# Patient Record
Sex: MALE | Race: BLACK OR AFRICAN AMERICAN | NOT HISPANIC OR LATINO | Employment: OTHER | URBAN - METROPOLITAN AREA
[De-identification: names, ages, dates, MRNs, and addresses within clinical notes are randomized per-mention and may not be internally consistent; named-entity substitution may affect disease eponyms.]

---

## 2019-04-26 ENCOUNTER — TELEPHONE (OUTPATIENT)
Dept: FAMILY MEDICINE | Facility: CLINIC | Age: 29
End: 2019-04-26

## 2019-04-26 NOTE — TELEPHONE ENCOUNTER
Got edmondold of his grandmother who is his caretaker.  She stated that he has a brain injury and is unable to speak.  She also stated that he does have a primary clinic he will be following up with for his discharge.    Alma Ledezma, ALFREDO

## 2019-04-26 NOTE — TELEPHONE ENCOUNTER
Called patient left a message to call the clinic regarding being discharge from the hospital. Wanted to check on them on how their transitioning home is  and if they had any questions on the medications given at discharge. Will try again later.       Liseth Durbin MA

## 2021-06-16 PROBLEM — J96.00 ACUTE RESPIRATORY FAILURE (H): Status: ACTIVE | Noted: 2019-04-22

## 2021-06-16 PROBLEM — J96.00 ACUTE RESPIRATORY FAILURE, UNSP W HYPOXIA OR HYPERCAPNIA (H): Status: ACTIVE | Noted: 2019-04-22

## 2022-03-29 ENCOUNTER — APPOINTMENT (OUTPATIENT)
Dept: RADIOLOGY | Facility: HOSPITAL | Age: 32
DRG: 177 | End: 2022-03-29
Attending: EMERGENCY MEDICINE
Payer: COMMERCIAL

## 2022-03-29 ENCOUNTER — HOSPITAL ENCOUNTER (INPATIENT)
Facility: HOSPITAL | Age: 32
LOS: 3 days | Discharge: HOME | DRG: 177 | End: 2022-04-01
Attending: EMERGENCY MEDICINE | Admitting: INTERNAL MEDICINE
Payer: COMMERCIAL

## 2022-03-29 ENCOUNTER — APPOINTMENT (OUTPATIENT)
Dept: CT IMAGING | Facility: HOSPITAL | Age: 32
DRG: 177 | End: 2022-03-29
Attending: EMERGENCY MEDICINE
Payer: COMMERCIAL

## 2022-03-29 DIAGNOSIS — J18.9 PNEUMONIA DUE TO INFECTIOUS ORGANISM, UNSPECIFIED LATERALITY, UNSPECIFIED PART OF LUNG: Primary | ICD-10-CM

## 2022-03-29 DIAGNOSIS — I47.10 PAROXYSMAL SUPRAVENTRICULAR TACHYCARDIA (H): ICD-10-CM

## 2022-03-29 DIAGNOSIS — R11.10 VOMITING, INTRACTABILITY OF VOMITING NOT SPECIFIED, PRESENCE OF NAUSEA NOT SPECIFIED, UNSPECIFIED VOMITING TYPE: ICD-10-CM

## 2022-03-29 DIAGNOSIS — J69.0 ASPIRATION PNEUMONIA, UNSPECIFIED ASPIRATION PNEUMONIA TYPE, UNSPECIFIED LATERALITY, UNSPECIFIED PART OF LUNG (H): ICD-10-CM

## 2022-03-29 LAB
ALBUMIN SERPL-MCNC: 3.5 G/DL (ref 3.5–5)
ALP SERPL-CCNC: 66 U/L (ref 45–120)
ALT SERPL W P-5'-P-CCNC: 15 U/L (ref 0–45)
ANION GAP SERPL CALCULATED.3IONS-SCNC: 17 MMOL/L (ref 5–18)
AST SERPL W P-5'-P-CCNC: 11 U/L (ref 0–40)
ATRIAL RATE - MUSE: 126 BPM
ATRIAL RATE - MUSE: 227 BPM
BASOPHILS # BLD AUTO: 0 10E3/UL (ref 0–0.2)
BASOPHILS NFR BLD AUTO: 0 %
BILIRUB SERPL-MCNC: 0.8 MG/DL (ref 0–1)
BUN SERPL-MCNC: 14 MG/DL (ref 8–22)
CALCIUM SERPL-MCNC: 10 MG/DL (ref 8.5–10.5)
CHLORIDE BLD-SCNC: 97 MMOL/L (ref 98–107)
CO2 SERPL-SCNC: 22 MMOL/L (ref 22–31)
CREAT SERPL-MCNC: 0.83 MG/DL (ref 0.7–1.3)
DIASTOLIC BLOOD PRESSURE - MUSE: NORMAL MMHG
DIASTOLIC BLOOD PRESSURE - MUSE: NORMAL MMHG
EOSINOPHIL # BLD AUTO: 0 10E3/UL (ref 0–0.7)
EOSINOPHIL NFR BLD AUTO: 0 %
ERYTHROCYTE [DISTWIDTH] IN BLOOD BY AUTOMATED COUNT: 13.9 % (ref 10–15)
FLUAV RNA SPEC QL NAA+PROBE: NEGATIVE
FLUBV RNA RESP QL NAA+PROBE: NEGATIVE
GFR SERPL CREATININE-BSD FRML MDRD: >90 ML/MIN/1.73M2
GLUCOSE BLD-MCNC: 178 MG/DL (ref 70–125)
HCT VFR BLD AUTO: 39.5 % (ref 40–53)
HGB BLD-MCNC: 12.7 G/DL (ref 13.3–17.7)
IMM GRANULOCYTES # BLD: 0 10E3/UL
IMM GRANULOCYTES NFR BLD: 0 %
INR PPP: 1.39 (ref 0.85–1.15)
INTERPRETATION ECG - MUSE: NORMAL
INTERPRETATION ECG - MUSE: NORMAL
LACTATE SERPL-SCNC: 1.4 MMOL/L (ref 0.7–2)
LACTATE SERPL-SCNC: 4.8 MMOL/L (ref 0.7–2)
LIPASE SERPL-CCNC: 21 U/L (ref 0–52)
LYMPHOCYTES # BLD AUTO: 0.4 10E3/UL (ref 0.8–5.3)
LYMPHOCYTES NFR BLD AUTO: 7 %
MCH RBC QN AUTO: 30.5 PG (ref 26.5–33)
MCHC RBC AUTO-ENTMCNC: 32.2 G/DL (ref 31.5–36.5)
MCV RBC AUTO: 95 FL (ref 78–100)
MONOCYTES # BLD AUTO: 0.2 10E3/UL (ref 0–1.3)
MONOCYTES NFR BLD AUTO: 3 %
NEUTROPHILS # BLD AUTO: 5.3 10E3/UL (ref 1.6–8.3)
NEUTROPHILS NFR BLD AUTO: 90 %
NRBC # BLD AUTO: 0 10E3/UL
NRBC BLD AUTO-RTO: 0 /100
P AXIS - MUSE: 63 DEGREES
P AXIS - MUSE: NORMAL DEGREES
PLATELET # BLD AUTO: 179 10E3/UL (ref 150–450)
POTASSIUM BLD-SCNC: 4.6 MMOL/L (ref 3.5–5)
PR INTERVAL - MUSE: 124 MS
PR INTERVAL - MUSE: NORMAL MS
PROT SERPL-MCNC: 8.9 G/DL (ref 6–8)
QRS DURATION - MUSE: 68 MS
QRS DURATION - MUSE: 78 MS
QT - MUSE: 200 MS
QT - MUSE: 314 MS
QTC - MUSE: 384 MS
QTC - MUSE: 454 MS
R AXIS - MUSE: 78 DEGREES
R AXIS - MUSE: 98 DEGREES
RBC # BLD AUTO: 4.17 10E6/UL (ref 4.4–5.9)
SARS-COV-2 RNA RESP QL NAA+PROBE: NEGATIVE
SODIUM SERPL-SCNC: 136 MMOL/L (ref 136–145)
SYSTOLIC BLOOD PRESSURE - MUSE: NORMAL MMHG
SYSTOLIC BLOOD PRESSURE - MUSE: NORMAL MMHG
T AXIS - MUSE: 208 DEGREES
T AXIS - MUSE: 69 DEGREES
VENTRICULAR RATE- MUSE: 126 BPM
VENTRICULAR RATE- MUSE: 222 BPM
WBC # BLD AUTO: 6 10E3/UL (ref 4–11)

## 2022-03-29 PROCEDURE — 250N000011 HC RX IP 250 OP 636: Performed by: INTERNAL MEDICINE

## 2022-03-29 PROCEDURE — 74177 CT ABD & PELVIS W/CONTRAST: CPT

## 2022-03-29 PROCEDURE — 96365 THER/PROPH/DIAG IV INF INIT: CPT

## 2022-03-29 PROCEDURE — 83605 ASSAY OF LACTIC ACID: CPT | Performed by: INTERNAL MEDICINE

## 2022-03-29 PROCEDURE — 258N000003 HC RX IP 258 OP 636: Performed by: EMERGENCY MEDICINE

## 2022-03-29 PROCEDURE — 96361 HYDRATE IV INFUSION ADD-ON: CPT

## 2022-03-29 PROCEDURE — 80053 COMPREHEN METABOLIC PANEL: CPT | Performed by: EMERGENCY MEDICINE

## 2022-03-29 PROCEDURE — 36415 COLL VENOUS BLD VENIPUNCTURE: CPT | Performed by: EMERGENCY MEDICINE

## 2022-03-29 PROCEDURE — 93005 ELECTROCARDIOGRAM TRACING: CPT | Performed by: EMERGENCY MEDICINE

## 2022-03-29 PROCEDURE — 85610 PROTHROMBIN TIME: CPT | Performed by: EMERGENCY MEDICINE

## 2022-03-29 PROCEDURE — 96375 TX/PRO/DX INJ NEW DRUG ADDON: CPT

## 2022-03-29 PROCEDURE — 250N000011 HC RX IP 250 OP 636: Performed by: EMERGENCY MEDICINE

## 2022-03-29 PROCEDURE — 96366 THER/PROPH/DIAG IV INF ADDON: CPT

## 2022-03-29 PROCEDURE — 999N000127 HC STATISTIC PERIPHERAL IV START W US GUIDANCE

## 2022-03-29 PROCEDURE — 83690 ASSAY OF LIPASE: CPT | Performed by: EMERGENCY MEDICINE

## 2022-03-29 PROCEDURE — 85025 COMPLETE CBC W/AUTO DIFF WBC: CPT | Performed by: EMERGENCY MEDICINE

## 2022-03-29 PROCEDURE — 87040 BLOOD CULTURE FOR BACTERIA: CPT | Performed by: EMERGENCY MEDICINE

## 2022-03-29 PROCEDURE — 83605 ASSAY OF LACTIC ACID: CPT | Performed by: EMERGENCY MEDICINE

## 2022-03-29 PROCEDURE — 87636 SARSCOV2 & INF A&B AMP PRB: CPT | Performed by: EMERGENCY MEDICINE

## 2022-03-29 PROCEDURE — 99285 EMERGENCY DEPT VISIT HI MDM: CPT | Mod: 25

## 2022-03-29 PROCEDURE — C9803 HOPD COVID-19 SPEC COLLECT: HCPCS

## 2022-03-29 PROCEDURE — 36415 COLL VENOUS BLD VENIPUNCTURE: CPT | Performed by: INTERNAL MEDICINE

## 2022-03-29 PROCEDURE — 258N000003 HC RX IP 258 OP 636: Performed by: INTERNAL MEDICINE

## 2022-03-29 PROCEDURE — 71045 X-RAY EXAM CHEST 1 VIEW: CPT

## 2022-03-29 PROCEDURE — 120N000001 HC R&B MED SURG/OB

## 2022-03-29 PROCEDURE — 99223 1ST HOSP IP/OBS HIGH 75: CPT | Performed by: INTERNAL MEDICINE

## 2022-03-29 RX ORDER — PIPERACILLIN SODIUM, TAZOBACTAM SODIUM 3; .375 G/15ML; G/15ML
3.38 INJECTION, POWDER, LYOPHILIZED, FOR SOLUTION INTRAVENOUS ONCE
Status: COMPLETED | OUTPATIENT
Start: 2022-03-29 | End: 2022-03-29

## 2022-03-29 RX ORDER — MORPHINE SULFATE 2 MG/ML
2 INJECTION, SOLUTION INTRAMUSCULAR; INTRAVENOUS
Status: DISCONTINUED | OUTPATIENT
Start: 2022-03-29 | End: 2022-04-01 | Stop reason: HOSPADM

## 2022-03-29 RX ORDER — ONDANSETRON 2 MG/ML
4 INJECTION INTRAMUSCULAR; INTRAVENOUS EVERY 6 HOURS PRN
Status: DISCONTINUED | OUTPATIENT
Start: 2022-03-29 | End: 2022-04-01 | Stop reason: HOSPADM

## 2022-03-29 RX ORDER — ONDANSETRON 2 MG/ML
4 INJECTION INTRAMUSCULAR; INTRAVENOUS EVERY 30 MIN PRN
Status: DISCONTINUED | OUTPATIENT
Start: 2022-03-29 | End: 2022-03-29

## 2022-03-29 RX ORDER — LIDOCAINE 40 MG/G
CREAM TOPICAL
Status: DISCONTINUED | OUTPATIENT
Start: 2022-03-29 | End: 2022-04-01 | Stop reason: HOSPADM

## 2022-03-29 RX ORDER — ADENOSINE 3 MG/ML
6 INJECTION, SOLUTION INTRAVENOUS ONCE
Status: DISCONTINUED | OUTPATIENT
Start: 2022-03-29 | End: 2022-03-29

## 2022-03-29 RX ORDER — ACETAMINOPHEN 325 MG/1
650 TABLET ORAL EVERY 6 HOURS PRN
Status: DISCONTINUED | OUTPATIENT
Start: 2022-03-29 | End: 2022-03-30

## 2022-03-29 RX ORDER — ACETAMINOPHEN 650 MG/1
650 SUPPOSITORY RECTAL EVERY 6 HOURS PRN
Status: DISCONTINUED | OUTPATIENT
Start: 2022-03-29 | End: 2022-03-30

## 2022-03-29 RX ORDER — ONDANSETRON 4 MG/1
4 TABLET, ORALLY DISINTEGRATING ORAL EVERY 6 HOURS PRN
Status: DISCONTINUED | OUTPATIENT
Start: 2022-03-29 | End: 2022-04-01 | Stop reason: HOSPADM

## 2022-03-29 RX ORDER — PIPERACILLIN SODIUM, TAZOBACTAM SODIUM 3; .375 G/15ML; G/15ML
3.38 INJECTION, POWDER, LYOPHILIZED, FOR SOLUTION INTRAVENOUS EVERY 8 HOURS
Status: DISCONTINUED | OUTPATIENT
Start: 2022-03-29 | End: 2022-03-31

## 2022-03-29 RX ORDER — IOPAMIDOL 755 MG/ML
100 INJECTION, SOLUTION INTRAVASCULAR ONCE
Status: COMPLETED | OUTPATIENT
Start: 2022-03-29 | End: 2022-03-29

## 2022-03-29 RX ORDER — SODIUM CHLORIDE 9 MG/ML
INJECTION, SOLUTION INTRAVENOUS CONTINUOUS
Status: DISCONTINUED | OUTPATIENT
Start: 2022-03-29 | End: 2022-03-30

## 2022-03-29 RX ORDER — SODIUM CHLORIDE 9 MG/ML
INJECTION, SOLUTION INTRAVENOUS CONTINUOUS
Status: DISCONTINUED | OUTPATIENT
Start: 2022-03-29 | End: 2022-04-01 | Stop reason: HOSPADM

## 2022-03-29 RX ORDER — ADENOSINE 3 MG/ML
12 INJECTION, SOLUTION INTRAVENOUS
Status: DISCONTINUED | OUTPATIENT
Start: 2022-03-29 | End: 2022-03-29

## 2022-03-29 RX ADMIN — SODIUM CHLORIDE 500 ML: 9 INJECTION, SOLUTION INTRAVENOUS at 13:01

## 2022-03-29 RX ADMIN — SODIUM CHLORIDE: 9 INJECTION, SOLUTION INTRAVENOUS at 20:34

## 2022-03-29 RX ADMIN — SODIUM CHLORIDE: 9 INJECTION, SOLUTION INTRAVENOUS at 19:00

## 2022-03-29 RX ADMIN — ENOXAPARIN SODIUM 30 MG: 30 INJECTION SUBCUTANEOUS at 20:34

## 2022-03-29 RX ADMIN — SODIUM CHLORIDE: 9 INJECTION, SOLUTION INTRAVENOUS at 14:47

## 2022-03-29 RX ADMIN — MORPHINE SULFATE 2 MG: 2 INJECTION, SOLUTION INTRAMUSCULAR; INTRAVENOUS at 13:02

## 2022-03-29 RX ADMIN — PIPERACILLIN AND TAZOBACTAM 3.38 G: 3; .375 INJECTION, POWDER, LYOPHILIZED, FOR SOLUTION INTRAVENOUS at 14:55

## 2022-03-29 RX ADMIN — IOPAMIDOL 100 ML: 755 INJECTION, SOLUTION INTRAVENOUS at 16:15

## 2022-03-29 RX ADMIN — SODIUM CHLORIDE 1000 ML: 9 INJECTION, SOLUTION INTRAVENOUS at 13:34

## 2022-03-29 RX ADMIN — ONDANSETRON 4 MG: 2 INJECTION INTRAMUSCULAR; INTRAVENOUS at 12:51

## 2022-03-29 RX ADMIN — PIPERACILLIN AND TAZOBACTAM 3.38 G: 3; .375 INJECTION, POWDER, LYOPHILIZED, FOR SOLUTION INTRAVENOUS at 20:34

## 2022-03-29 ASSESSMENT — ACTIVITIES OF DAILY LIVING (ADL)
ADLS_ACUITY_SCORE: 18
DOING_ERRANDS_INDEPENDENTLY_DIFFICULTY: NO
CONCENTRATING,_REMEMBERING_OR_MAKING_DECISIONS_DIFFICULTY: YES
DIFFICULTY_COMMUNICATING: YES
ADLS_ACUITY_SCORE: 18
SWALLOWING: 2-->DIFFICULTY SWALLOWING LIQUIDS/FOODS
ADLS_ACUITY_SCORE: 8
SWALLOWING: 2-->DIFFICULTY SWALLOWING LIQUIDS/FOODS
EATING/SWALLOWING: OTHER (SEE COMMENTS)
ADLS_ACUITY_SCORE: 12
ADLS_ACUITY_SCORE: 24
ADLS_ACUITY_SCORE: 18
DIFFICULTY_EATING/SWALLOWING: YES
EATING: 2-->COMPLETELY DEPENDENT (NOT DEVELOPMENTALLY APPROPRIATE)
WEAR_GLASSES_OR_BLIND: NO
WALKING_OR_CLIMBING_STAIRS_DIFFICULTY: NO
FALL_HISTORY_WITHIN_LAST_SIX_MONTHS: NO
HEARING_DIFFICULTY_OR_DEAF: NO
DRESSING/BATHING_DIFFICULTY: NO
EATING: 2-->COMPLETELY DEPENDENT
CHANGE_IN_FUNCTIONAL_STATUS_SINCE_ONSET_OF_CURRENT_ILLNESS/INJURY: NO
TOILETING_ISSUES: NO

## 2022-03-29 ASSESSMENT — ENCOUNTER SYMPTOMS: VOMITING: 1

## 2022-03-29 NOTE — PHARMACY-ADMISSION MEDICATION HISTORY
Pharmacy Note - Admission Medication History    Pertinent Provider Information: Patient is not on any medications PTA      ______________________________________________________________________    Prior To Admission (PTA) med list completed and updated in EMR.       No outpatient medications have been marked as taking for the 3/29/22 encounter (Hospital Encounter).       Information [facility](s[facility]  Method of interview communication: in-person    Summary of Changes to PTA Med List  New: -  Discontinued: diltiazem, tylenol, dayquil, nyquil   Changed: -    Patient was asked about OTC/herbal products specifically.  PTA med list reflects this.    Allergies were reviewed, assessed, and updated with the patient.      Patient does not use any multi-dose medications prior to admission.    The information provided in this note is only as accurate as the sources available at the time of the update(s).    Thank you for the opportunity to participate in the care of this patient.    ZITA SARMIENTO RPH  3/29/2022 5:09 PM

## 2022-03-29 NOTE — ED PROVIDER NOTES
EMERGENCY DEPARTMENT SIGN OUT NOTE        ED COURSE AND MEDICAL DECISION MAKING  Patient was signed out to me by Dr Debi Tinsley at 2:00 PM.  5:37 PM.  CT imaging without evidence of any acute intra-abdominal pathology. Redemonstration of prior noted aspiration pneumonia.  We will proceed with admission.  5:54 PM Discussed with Dr. Perera, who accepts patient for admission.  He was also cautioned about the patient having episodes of SVT when physically or emotionally stressed.    In brief, Marquise LESLEE Brody is a 31 year old male who initially presented for evaluation of vomiting.  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 Today (3/29), the patient has been experiencing constant vomiting up food. A couple weeks ago, his 20 size was placed in his GJ tube, and he began to loose weight. Afterward, he saw a provider, and it was replaced by a 22 size. The patient's family is not sure if the vomiting is due to the wrong sized being placed, or build up of food. His grandmother states that his stomach has been swelling above his GJ tube more than normal, where usually it swells more around his GJ tube. In addition, his grandmother states that the patient becomes excited with stimulation. She reports that usually he is smiling and laughing, but today he presents much more serious than normal, with his arms more stiff.    At time of sign out, disposition was pending CT abdomen pelvis.    FINAL IMPRESSION    1. Aspiration pneumonia, unspecified aspiration pneumonia type, unspecified laterality, unspecified part of lung (H)    2. Vomiting, intractability of vomiting not specified, presence of nausea not specified, unspecified vomiting type    3. Paroxysmal supraventricular tachycardia (H)        ED MEDS  Medications   ondansetron (ZOFRAN) injection 4 mg (4 mg Intravenous Not Given 3/29/22 1413)   0.9% sodium chloride BOLUS (0 mLs Intravenous Stopped 3/29/22 1435)     Followed by    sodium chloride 0.9% infusion ( Intravenous Rate/Dose Verify 3/29/22 3774)   morphine (PF) injection 2 mg (2 mg Intravenous Given 3/29/22 1302)   0.9% sodium chloride BOLUS (0 mLs Intravenous Stopped 3/29/22 1435)   piperacillin-tazobactam (ZOSYN) 3.375 g vial to attach to  mL bag (0 g Intravenous Stopped 3/29/22 1743)   iopamidol (ISOVUE-370) solution 100 mL (100 mLs Intravenous Given 3/29/22 1615)       LAB  Labs Ordered and Resulted from Time of ED Arrival to Time of ED Departure   COMPREHENSIVE METABOLIC PANEL - Abnormal       Result Value    Sodium 136      Potassium 4.6      Chloride 97 (*)     Carbon Dioxide (CO2) 22      Anion Gap 17      Urea Nitrogen 14      Creatinine 0.83      Calcium 10.0      Glucose 178 (*)     Alkaline Phosphatase 66      AST 11      ALT 15      Protein Total 8.9 (*)     Albumin 3.5      Bilirubin Total 0.8      GFR Estimate >90     CBC WITH PLATELETS AND DIFFERENTIAL - Abnormal    WBC Count 6.0      RBC Count 4.17 (*)     Hemoglobin 12.7 (*)     Hematocrit 39.5 (*)     MCV 95      MCH 30.5      MCHC 32.2      RDW 13.9      Platelet Count 179      % Neutrophils 90      % Lymphocytes 7      % Monocytes 3      % Eosinophils 0      % Basophils 0      % Immature Granulocytes 0      NRBCs per 100 WBC 0      Absolute Neutrophils 5.3      Absolute Lymphocytes 0.4 (*)     Absolute Monocytes 0.2      Absolute Eosinophils 0.0      Absolute Basophils 0.0      Absolute Immature Granulocytes 0.0      Absolute NRBCs 0.0     INR - Abnormal    INR 1.39 (*)    LACTIC ACID WHOLE BLOOD - Abnormal    Lactic Acid 4.8 (*)    LIPASE - Normal    Lipase 21     INFLUENZA A/B & SARS-COV2 PCR MULTIPLEX - Normal    Influenza A PCR Negative      Influenza B PCR Negative      SARS CoV2 PCR Negative     BLOOD CULTURE   BLOOD CULTURE       RADIOLOGY    CT Abdomen Pelvis w Contrast   Final Result   IMPRESSION:    1.  Severe right lower lobe and mild to moderate right middle and left lower lobar aspiration  pneumonia. No pleural effusion.   2.  No acute findings in the abdomen or pelvis. No evidence of a bowel obstruction or inflammatory process.   3.  Satisfactory PEG tube position. No adjacent fluid collection or extraluminal air.   4.  Stable large bladder stones.      XR Chest Port 1 View   Final Result   IMPRESSION: There some mild diffuse infiltrates throughout both lungs greater on the right side. Patient had some similar infiltrates back on 04/23/2019. Uncertain if these are all chronic findings or recurrent infiltrates. Concerning for an acute    pneumonia given the appearance.          DISCHARGE MEDS  New Prescriptions    No medications on file       Heladio Ch MD  Emergency Medicine  [facility] EMERGENCY DEPARTMENT  [facility] [facility] [address]  [phone]     Heladio Ch MD  03/29/22 7877

## 2022-03-29 NOTE — ED NOTES
Assumed care, received report from Devika. Family members at bedside with pt. Pt tolerated the Covid swab. No respiratory distress.

## 2022-03-29 NOTE — ED NOTES
Patient O2 reading 88-90% on room air with good wave form. Patient coughing, appearing to have difficulty clearing secretions completely. Continuing to use younker at bedside. Placed on 2L O2 per nasal canula.

## 2022-03-29 NOTE — ED TRIAGE NOTES
Pt, non  Verbal, with family caregivers reporting, vomiting x 4 in past 24 hours,n ormal stools. No daily meds, pt does get tube feedings , wheelchair bound.

## 2022-03-29 NOTE — ED PROVIDER NOTES
Emergency Department Encounter     Evaluation Date & Time:   3/29/2022 12:02 PM    CHIEF COMPLAINT:  Vomiting      Triage Note:Pt, non  Verbal, with family caregivers reporting, vomiting x 4 in past 24 hours,n ormal stools. No daily meds, pt does get tube feedings , wheelchair bound.      FINAL IMPRESSION:    ICD-10-CM    1. Aspiration pneumonia, unspecified aspiration pneumonia type, unspecified laterality, unspecified part of lung (H)  J69.0    2. Vomiting, intractability of vomiting not specified, presence of nausea not specified, unspecified vomiting type  R11.10    3. Paroxysmal supraventricular tachycardia (H)  I47.1        Impression and Plan     ED COURSE & MEDICAL DECISION MAKING:        ED Course as of 03/29/22 1428   Tue Mar 29, 2022   1222 Initial encounter and examination of the patient.   1249 Patient having vomiting.  May be malfunctioning gj tube, sbo, or other gi cause of vomiting.  Patient cannot participate in giving history or every really localizing where he hurts and he will grimace and moan when unknown persons press on his body.  So, will do ct abd.  Rn tried to pull back fluid in case he had a lot of fluid in abd we could remove to avoid further emesis, but they were unable to pull back anything.  He has crackles and cough when listen to lungs, so ordered cxr as he is high risk for aspiration as well.  He looks dry as a cause of hypotension with the recurrent emesis so giving boluses.   1310 Patient's rhythm changed, will order repeat EKG.   1329 Rhythm change is to svt.  Patient remains awake alert and lab is trying to draw labs.  His family are both at the bedside and they note this is normal for him to spike his hr up when nursing staff is working on him.  We will see if this goes down once he can rest between interventions, and now that got zofran/morphine and fluids are going in.  His family agree that is the best approach.   1334 Looking back in his chart I do see that in April 2019  he did have SVT as well near 200s.  He is not cyanotic   1340 They are calling picc rn to get the blood cultures.  Rn will give pt a break now to relax, let meds set in, and just get portable cxr or now.   1359 Patient's cxr does appear to have an infiltrate.  Will need blood cultures, and then will do ct of the chest when he goes over for his abd.  He continues to tolerate the svt well - very alert.  Picc rn drawing blood.   1428 Prior to receiving any rate control med for it, once he was no longer being poked for labs/iv, his svt spontaneously resolved.  Bp I,proving with fluids.       At the conclusion of the encounter I discussed the results of all the tests and the disposition. The questions were answered. The patient or family acknowledged understanding and was agreeable with the care plan.          0 minutes of critical care time    PPE: head covering and face mask  MEDICATIONS GIVEN IN THE EMERGENCY DEPARTMENT:  Medications   ondansetron (ZOFRAN) injection 4 mg (4 mg Intravenous Not Given 3/29/22 1413)   0.9% sodium chloride BOLUS (1,000 mLs Intravenous New Bag 3/29/22 1334)     Followed by   sodium chloride 0.9% infusion (has no administration in time range)   morphine (PF) injection 2 mg (2 mg Intravenous Given 3/29/22 1302)   piperacillin-tazobactam (ZOSYN) 3.375 g vial to attach to  mL bag (has no administration in time range)   0.9% sodium chloride BOLUS (500 mLs Intravenous New Bag 3/29/22 1301)       NEW PRESCRIPTIONS STARTED AT TODAY'S ED VISIT:  New Prescriptions    No medications on file       HPI     HPI     Marquise LESLEE Brody is a 31 year old male with a pertinent history of TBI, GJ tube in place, MRSA, spastic tetraplegia, spasticity, anoxic brain injury, acute respiratory failure, acute encephalopathy, aspiration pneumonia of right lower lobe due to vomit, and dysphagia who presents to this ED via private car for evaluation of vomiting.    The patient presents with grandmother and uncle, who  he lives with.  HPI and ROS limited due to patient being nonverbal.    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 Today (3/29), the patient has been experiencing constant vomiting up food. A couple weeks ago, his 20 size was placed in his GJ tube, and he began to loose weight. Afterward, he saw a provider, and it was replaced by a 22 size. The patient's family is not sure if the vomiting is due to the wrong sized being placed, or build up of food. His grandmother states that his stomach has been swelling above his GJ tube more than normal, where usually it swells more around his GJ tube. In addition, his grandmother states that the patient becomes excited with stimulation. She reports that usually he is smiling and laughing, but today he presents much more serious than normal, with his arms more stiff.    REVIEW OF SYSTEMS:  Review of Systems   Gastrointestinal: Positive for vomiting.     remainder of systems are all otherwise negative.        Medical History     History reviewed. No pertinent past medical history.    Past Surgical History:   Procedure Laterality Date     GASTROSTOMY, INSERT TUBE, COMBINED       PICC  4/22/2019            No family history on file.    Social History     Tobacco Use     Smoking status: Never Smoker     Smokeless tobacco: None   Substance Use Topics     Alcohol use: No     Drug use: None     Comment: Drug use: JUNIOR       acetaminophen (TYLENOL) 650 mg/20.3 mL Soln  diltiazem (CARDIZEM CD) 180 MG 24 hr capsule  DM/acetaminophen/doxylamine (VICKS NYQUIL NIGHTTIME RELIEF ORAL)  DM/PE/acetaminophen/doxylamine (VICKS DAYQUIL-NYQUIL ORAL)  guaiFENesin 200 mg tablet        Physical Exam     First Vitals:  Patient Vitals for the past 24 hrs:   BP Temp Temp src Pulse Resp SpO2 Height Weight   03/29/22 1400 107/54 -- -- (!) 188 24 96 % -- --   03/29/22 1345 96/53 -- -- (!) 207 (!) 40 93 % -- --   03/29/22 1339 -- -- -- (!) 198 (!) 37 95 % -- --  "  03/29/22 1334 -- -- -- (!) 211 (!) 32 92 % -- --   03/29/22 1330 (!) 88/52 -- -- (!) 205 21 -- -- --   03/29/22 1300 94/68 -- -- (!) 138 -- 92 % -- --   03/29/22 1230 104/84 -- -- (!) 138 -- 96 % -- --   03/29/22 1229 -- -- -- (!) 133 -- 99 % -- --   03/29/22 1227 108/80 -- -- -- -- -- -- --   03/29/22 1122 -- -- -- -- -- 94 % -- --   03/29/22 1119 (!) 81/51 99.4  F (37.4  C) Temporal -- 20 96 % 1.905 m (6' 3\") 52.2 kg (115 lb)       PHYSICAL EXAM:   Constitutional:  Laying semi-reclined in bed.   HENT:  Normocephalic, posterior pharynx wnl, mucous membranes tacky and dark pink   Eyes:  PERRL, EOMI, Conjunctiva normal, No discharge, no scleral icterus.  Respiratory: slight cough, coarse breath sounds.  Cardiovascular:  Slightly tachycardic, nl s1s2 0 murmurs, rubs, or gallops.  Peripheral pulses dp, pt, and radial are wnl.  no peripheral edema   GI:  Bowel sounds normal, Soft, No tenderness, No flank tenderness, nondistended. GJ tube located in mid epigastric area.surrounding skin c/d/i without rash.  :No CVA tenderness.   Musculoskeletal:  Moves all extremities.  No erythematous or swollen major joints,   Integument:  Cool extremities.   Lymphatic:  No cervical lymphadenopathy  Neurologic:  Quadriplegic, some contractures. Opens eyes to stimulation, moans with stimulation.  Psychiatric:  Affect normal, Judgment normal, Mood normal.     Results     LAB AND RADIOLOGY:  All pertinent labs reviewed and interpreted  Results for orders placed or performed during the hospital encounter of 03/29/22   XR Chest Port 1 View     Status: None    Narrative    EXAM: XR CHEST PORT 1 VIEW  LOCATION: [facility]  DATE/TIME: 3/29/2022 12:54 PM    INDICATION: cough.  recent recurrent emesis in quadriplegic with gj tube.  COMPARISON: 04/23/2019      Impression    IMPRESSION: There some mild diffuse infiltrates throughout both lungs greater on the right side. Patient had some similar infiltrates back on " 04/23/2019. Uncertain if these are all chronic findings or recurrent infiltrates. Concerning for an acute   pneumonia given the appearance.   Comprehensive metabolic panel     Status: Abnormal   Result Value Ref Range    Sodium 136 136 - 145 mmol/L    Potassium 4.6 3.5 - 5.0 mmol/L    Chloride 97 (L) 98 - 107 mmol/L    Carbon Dioxide (CO2) 22 22 - 31 mmol/L    Anion Gap 17 5 - 18 mmol/L    Urea Nitrogen 14 8 - 22 mg/dL    Creatinine 0.83 0.70 - 1.30 mg/dL    Calcium 10.0 8.5 - 10.5 mg/dL    Glucose 178 (H) 70 - 125 mg/dL    Alkaline Phosphatase 66 45 - 120 U/L    AST 11 0 - 40 U/L    ALT 15 0 - 45 U/L    Protein Total 8.9 (H) 6.0 - 8.0 g/dL    Albumin 3.5 3.5 - 5.0 g/dL    Bilirubin Total 0.8 0.0 - 1.0 mg/dL    GFR Estimate >90 >60 mL/min/1.73m2   Lipase     Status: Normal   Result Value Ref Range    Lipase 21 0 - 52 U/L   CBC with platelets differential     Status: None ()    Narrative    The following orders were created for panel order CBC with platelets differential.  Procedure                               Abnormality         Status                     ---------                               -----------         ------                     CBC with platelets and d...[[id number]]                                                   Please view results for these tests on the individual orders.         ECG:    Performed at: 1319    Impression: svt rate of 222,pr *, r axis, qrs 68ms, qtc 384ms, prt axes * 98 208.  Nonspecific st fidnings in inferior limb leads likely due to rate.  Compared to 4/23/19 patient is now in svt changed from nsr.    Performed at:  1423  Impression:  nst rate of 126, no acute stfindings.  Low voltage in I, avl.  Pr 124ms, qrs 78ms, qtc 454ms, prt axes 63 78 69.  Compared to earlier today svt has resolved and the nonspecific st findings resolved.    I have independently reviewed and interpreted the EKS(s) documented above    PROCEDURES:  Procedures:      [facility] Documentation       CMS  Diagnoses:most of patient's elevated hr and low bp appears to be due to dehydration and agitation.  Cannot confirm they are from infection at this time to diagnose sepsis as other causes are apparent.       The creation of this record is based on the scribe s observations of the work being performed by Niecy Harris and the provider s statements to them. This document has been checked and approved by MD Debi Montana MD  Emergency Medicine  [facility] EMERGENCY DEPARTMENT       Debi Tinsley MD  03/29/22 1342       Debi Tinsley MD  03/29/22 1402       Debi Tinsley MD  03/29/22 1426

## 2022-03-29 NOTE — ED NOTES
"[facility] ED Handoff Report    ED Chief Complaint: Vomiting and G-Tube complaints    ED Diagnosis:  (J69.0) Aspiration pneumonia, unspecified aspiration pneumonia type, unspecified laterality, unspecified part of lung (H)  Comment: na  Plan: na    (R11.10) Vomiting, intractability of vomiting not specified, presence of nausea not specified, unspecified vomiting type  Comment: na  Plan: na    (I47.1) Paroxysmal supraventricular tachycardia (H)  Comment: na  Plan: na       PMH:  History reviewed. No pertinent past medical history.     Code Status:  No Order     Falls Risk: Yes Band: Not applicable    Current Living Situation/Residence: live with family     Elimination Status: Continent: No     Activity Level: Total assist/lift    Patients Preferred Language:  English     Needed: No    Vital Signs:  BP 94/53   Pulse 102   Temp 99.4  F (37.4  C) (Temporal)   Resp 11   Ht 1.905 m (6' 3\")   Wt 52.2 kg (115 lb)   SpO2 100%   BMI 14.37 kg/m       Cardiac Rhythm: na    Pain Score: Patient is unable to quantify.    Is the Patient Confused:  No    Last Food or Drink: unknown    Focused Assessment:  Pt came to the ER because they did not think that his G-tube was working correctly. Pt is non-verbal, a quadriplegic and family is at bedside. He vomited and they believe he aspirated as his Xray showed aspiration pneumonia. Family is assisting pt with the yanker at bedside. Received antibiotics and morphine as family thought that he was in pain. CT of his abdomen showed the G-tube was in the right place.     Tests Performed: Done: Labs and Imaging    Treatments Provided:  jacinto    Family Dynamics/Concerns: No    Family Updated On Visitor Policy: Yes    Plan of Care Communicated to Family: Yes    Who Was Updated about Plan of Care: family at bedside    Belongings Checklist Done and Signed by Patient: No    Covid: asymptomatic , negative    Additional Information: jacinto    RN: Rita Mary RN 3/29/2022 6:11 " PM

## 2022-03-29 NOTE — ED NOTES
"ED Provider In Triage Note  [facility]  Encounter Date: Mar 29, 2022    Chief Complaint   Patient presents with     Vomiting   vomiting    Brief HPI:   Marquise LESLEE Brody is a 31 year old male presenting to the Emergency Department with a chief complaint of vomiting. Pt has a G tube that was recently replaced 1 wk ago. Now with vomiting. Normal stools. G tube working normally. Caregiver states he is moaning and she also thinks he is experiencing pain.    Brief Physical Exam:  BP (!) 81/51   Temp 99.4  F (37.4  C) (Temporal)   Resp 20   Ht 1.905 m (6' 3\")   Wt 52.2 kg (115 lb)   SpO2 96%   BMI 14.37 kg/m    General: Non-toxic appearing, chronically ill appearing with spastic quadraplegia in wheelchair. Nonverbal. Moaning.  HEENT: Atraumatic  Resp: No respiratory distress  Abdomen: Non-peritoneal  Neuro: Alert  Psych: Behavior appropriate      Plan Initiated in Triage:  Labs, CT due to vomiting and concern for pain in nonverbal patient      PIT Dispo:   Return to lobby while awaiting workup and ED bed availability    Gabby Hollis MD on 3/29/2022 at 11:14 AM    Patient was evaluated by the Physician in Triage due to a limitation of available rooms in the Emergency Department. A plan of care was discussed based on the information obtained on the initial evaluation and patient was consuled to return back to the Emergency Department lobby after this initial evalutaiton until results were obtained or a room became available in the Emergency Department. Patient was counseled not to leave prior to receiving the results of their workup.        Gabby Hollis MD  03/29/22 1120    "

## 2022-03-30 ENCOUNTER — APPOINTMENT (OUTPATIENT)
Dept: CARDIOLOGY | Facility: HOSPITAL | Age: 32
DRG: 177 | End: 2022-03-30
Attending: INTERNAL MEDICINE
Payer: COMMERCIAL

## 2022-03-30 LAB
ALBUMIN SERPL-MCNC: 2.6 G/DL (ref 3.5–5)
ALP SERPL-CCNC: 49 U/L (ref 45–120)
ALT SERPL W P-5'-P-CCNC: 11 U/L (ref 0–45)
ANION GAP SERPL CALCULATED.3IONS-SCNC: 5 MMOL/L (ref 5–18)
AST SERPL W P-5'-P-CCNC: 13 U/L (ref 0–40)
BILIRUB SERPL-MCNC: 0.5 MG/DL (ref 0–1)
BUN SERPL-MCNC: 12 MG/DL (ref 8–22)
CALCIUM SERPL-MCNC: 8.7 MG/DL (ref 8.5–10.5)
CHLORIDE BLD-SCNC: 108 MMOL/L (ref 98–107)
CO2 SERPL-SCNC: 25 MMOL/L (ref 22–31)
CREAT SERPL-MCNC: 0.63 MG/DL (ref 0.7–1.3)
ERYTHROCYTE [DISTWIDTH] IN BLOOD BY AUTOMATED COUNT: 14.4 % (ref 10–15)
GFR SERPL CREATININE-BSD FRML MDRD: >90 ML/MIN/1.73M2
GLUCOSE BLD-MCNC: 83 MG/DL (ref 70–125)
HCT VFR BLD AUTO: 34.5 % (ref 40–53)
HGB BLD-MCNC: 10.9 G/DL (ref 13.3–17.7)
LVEF ECHO: NORMAL
MCH RBC QN AUTO: 30.4 PG (ref 26.5–33)
MCHC RBC AUTO-ENTMCNC: 31.6 G/DL (ref 31.5–36.5)
MCV RBC AUTO: 96 FL (ref 78–100)
PLATELET # BLD AUTO: 155 10E3/UL (ref 150–450)
POTASSIUM BLD-SCNC: 3.8 MMOL/L (ref 3.5–5)
PROCALCITONIN SERPL-MCNC: 8.94 NG/ML (ref 0–0.49)
PROT SERPL-MCNC: 6.3 G/DL (ref 6–8)
RBC # BLD AUTO: 3.58 10E6/UL (ref 4.4–5.9)
SODIUM SERPL-SCNC: 138 MMOL/L (ref 136–145)
WBC # BLD AUTO: 7.6 10E3/UL (ref 4–11)

## 2022-03-30 PROCEDURE — 250N000011 HC RX IP 250 OP 636: Performed by: INTERNAL MEDICINE

## 2022-03-30 PROCEDURE — C9113 INJ PANTOPRAZOLE SODIUM, VIA: HCPCS | Performed by: INTERNAL MEDICINE

## 2022-03-30 PROCEDURE — 93306 TTE W/DOPPLER COMPLETE: CPT

## 2022-03-30 PROCEDURE — 99233 SBSQ HOSP IP/OBS HIGH 50: CPT | Performed by: EMERGENCY MEDICINE

## 2022-03-30 PROCEDURE — 80053 COMPREHEN METABOLIC PANEL: CPT | Performed by: INTERNAL MEDICINE

## 2022-03-30 PROCEDURE — 93306 TTE W/DOPPLER COMPLETE: CPT | Mod: 26 | Performed by: INTERNAL MEDICINE

## 2022-03-30 PROCEDURE — 84145 PROCALCITONIN (PCT): CPT | Performed by: INTERNAL MEDICINE

## 2022-03-30 PROCEDURE — 258N000003 HC RX IP 258 OP 636: Performed by: INTERNAL MEDICINE

## 2022-03-30 PROCEDURE — 36415 COLL VENOUS BLD VENIPUNCTURE: CPT | Performed by: INTERNAL MEDICINE

## 2022-03-30 PROCEDURE — 250N000013 HC RX MED GY IP 250 OP 250 PS 637: Performed by: INTERNAL MEDICINE

## 2022-03-30 PROCEDURE — 120N000001 HC R&B MED SURG/OB

## 2022-03-30 PROCEDURE — 85027 COMPLETE CBC AUTOMATED: CPT | Performed by: INTERNAL MEDICINE

## 2022-03-30 RX ORDER — ACETAMINOPHEN 650 MG/1
650 SUPPOSITORY RECTAL EVERY 6 HOURS PRN
Status: DISCONTINUED | OUTPATIENT
Start: 2022-03-30 | End: 2022-04-01 | Stop reason: HOSPADM

## 2022-03-30 RX ADMIN — PIPERACILLIN AND TAZOBACTAM 3.38 G: 3; .375 INJECTION, POWDER, LYOPHILIZED, FOR SOLUTION INTRAVENOUS at 12:24

## 2022-03-30 RX ADMIN — PIPERACILLIN AND TAZOBACTAM 3.38 G: 3; .375 INJECTION, POWDER, LYOPHILIZED, FOR SOLUTION INTRAVENOUS at 20:03

## 2022-03-30 RX ADMIN — ACETAMINOPHEN 650 MG: 325 TABLET ORAL at 02:21

## 2022-03-30 RX ADMIN — SODIUM CHLORIDE: 9 INJECTION, SOLUTION INTRAVENOUS at 17:57

## 2022-03-30 RX ADMIN — PIPERACILLIN AND TAZOBACTAM 3.38 G: 3; .375 INJECTION, POWDER, LYOPHILIZED, FOR SOLUTION INTRAVENOUS at 05:54

## 2022-03-30 RX ADMIN — ACETAMINOPHEN 650 MG: 325 SOLUTION ORAL at 22:50

## 2022-03-30 RX ADMIN — Medication 1 MG: at 02:21

## 2022-03-30 RX ADMIN — ENOXAPARIN SODIUM 30 MG: 30 INJECTION SUBCUTANEOUS at 20:04

## 2022-03-30 RX ADMIN — PANTOPRAZOLE SODIUM 40 MG: 40 INJECTION, POWDER, FOR SOLUTION INTRAVENOUS at 08:49

## 2022-03-30 ASSESSMENT — ACTIVITIES OF DAILY LIVING (ADL)
ADLS_ACUITY_SCORE: 20
ADLS_ACUITY_SCORE: 22
ADLS_ACUITY_SCORE: 24
ADLS_ACUITY_SCORE: 22
ADLS_ACUITY_SCORE: 24
DEPENDENT_IADLS:: CLEANING;COOKING;LAUNDRY;SHOPPING;MEAL PREPARATION;MEDICATION MANAGEMENT;MONEY MANAGEMENT;TRANSPORTATION;INCONTINENCE
ADLS_ACUITY_SCORE: 22
ADLS_ACUITY_SCORE: 20
ADLS_ACUITY_SCORE: 22
ADLS_ACUITY_SCORE: 24
ADLS_ACUITY_SCORE: 22
ADLS_ACUITY_SCORE: 20
ADLS_ACUITY_SCORE: 22
ADLS_ACUITY_SCORE: 24
ADLS_ACUITY_SCORE: 22
ADLS_ACUITY_SCORE: 20
ADLS_ACUITY_SCORE: 22
ADLS_ACUITY_SCORE: 20
ADLS_ACUITY_SCORE: 20
ADLS_ACUITY_SCORE: 22

## 2022-03-30 NOTE — PROGRESS NOTES
[facility]    Referral received  from RENETTA Arevalo, for enteral therapy.    Benefits verified. Pt has 100% coverage for enteral feedings through a tube under his [employer] PM[employer] plan.    Writer will speak with pt's family to review benefits, home infusion and to offer choice of agency/home infusion provider.    Thank you for the referral.    Kristen Katz RN, BSN  [facility] Liaison  [phone] (Mon through Fri, 8:00 am-5:00 pm)  [phone] (Office)

## 2022-03-30 NOTE — CONSULTS
CLINICAL NUTRITION SERVICES - ASSESSMENT NOTE     Nutrition Prescription    RECOMMENDATIONS FOR MDs/PROVIDERS TO ORDER:  Maged-Key low profile FT button placement here if possible per family request    Malnutrition Status:    Moderate in the context of chronic illness    Recommendations already ordered by Registered Dietitian (RD):  Enteral Nutrition - Initiate Julia otoole 1.4 20 ml/hr, increase by 10 ml/hr q 8 hours to goal 50 ml/lhr + 240 ml H20 q 4 hours = 1200 ml, 1680 kcal, 74 g protein, 188 g cho, 11 g fiber, 888 ml free h20 + flush = 1848 ml H20 total    Future/Additional Recommendations:  Adjust tF pending tolerance, weight  Change to bolus feeding once tolerance at continuous goal rate established  Plan to initiate FV home infusion and use of TF formula at home if tolerates current TF well     REASON FOR ASSESSMENT  Marquise LESLEE Brody is a/an 31 year old male assessed by the dietitian for Provider Order - Registered Dietitian to Assess and Order TF per Medical Nutrition Therapy Protocol    Pt presents with aspiration pneumonia d/t emesis  Hx TBI 2010, recurrent aspiration pneumonia, spastic tetraplegia, respiratory failure, dysphagia    NUTRITION HISTORY  Pt last seen by hospital RD 4/23/19    Spoke with grandmother and uncle who care for him at home.   Last received TF yesterday afternoon.     Grandmother normally blends all of pt food and gives it down his FT. Meat, vegetables, milk (but limits d/t increased phlegm), beans, rice, potatoes. She makes for him what ever they are eating at the time. Pt is fed every 4 hours to maintain weight and satisfy hunger.  Gives 8 oz juice and 8 oz water 5 x a day  Pt is able to indicate when he is hungry or doesn't like food provided down FT.  Grandmother reports they are extremely sanitary d/t concern for GI infections.    Pt had started losing weight over last 2 months and grandmother switched to Ensure enlive/Ensure regular 9 bottles a day x 1.5 months PTA ([id number]  "kcal/day depending on which type was used)-pt became constipated and grandmother switched back to blending food and added kale which improved his constipation  Grandmother reports no change in provisions/intake during time of weight loss-etiology unknown    New 22 Tristanian GT placed 2 weeks ago..   Emesis PTA. Grandmother reports food was getting stuck in new tube that was replaced and smelled sour-may have caused N/V.   Grandmother reports they were about to make an appointment with OP RD for he tube feeding/nutrition PTA.  Grandmother is very interested in having Maged-Key low profile FT button placed to prevent food getting stuck in FT and spoiling      CURRENT NUTRITION ORDERS  Diet: NPO  Nutrition Support: GT in place, 22 Tristanian    Receiving NS IVF at 125 ml/hr    Discussed slow initiation of TF and whole food formula to be provided. Discussed advantages of using TF formula vs blended food and use of Home TF company with RD on staff to follow for needs at home. Grandmother is interested in switching to this if pt tolerates TF here.    LABS  Labs reviewed    MEDICATIONS  Medications reviewed  Iv protonix, iv abx    ANTHROPOMETRICS  Height: 190.5 cm (6' 3\")  Most Recent Weight: 50.3 kg (111 lb)  3.4-7.5% weight loss x 2 months per family report  IBW: 80.4 kg  BMI: Underweight BMI <18.5  Weight History:   09/03/21 47.6 kg (105 lb)   07/15/21 47.6 kg (105 lb)  Grandmother reports -120 lb - she is able to visualize weight loss on him.   Grandmother goal weight for him is 130 lb.     Dosing Weight: 50.3 kg    ASSESSED NUTRITION NEEDS  *Difficult to determine calorie provisions/needs at home d/t blended food being provided  Estimated Energy Needs: 4440-7981 kcals/day (30 - 35 kcals/kg )  Justification: Underweight  Estimated Protein Needs: 50-60 grams protein/day (1 - 1.2 grams of pro/kg)  Justification: Increased needs, hypercatabolism with acute illness  Estimated Fluid Needs: 1260 + mL/day (25 ml +) "   Justification: Maintenance    PHYSICAL FINDINGS  See malnutrition section below.    MALNUTRITION:  % Weight Loss:  Up to 7.5% in 3 months (moderate malnutrition)  % Intake:  Decreased intake does not meet criteria for malnutrition   Subcutaneous Fat Loss:  Orbital region moderate depletion  Muscle Loss:  Temporal region moderate, depletion, buccal moderate depletion  Fluid Retention:  None noted  Pt is a quad. Fat and muscle loss is over last 2 months    Malnutrition Diagnosis: Moderate malnutrition  In Context of:  Chronic illness or disease    NUTRITION DIAGNOSIS  Malnutrition r/t altered GI function, increasing needs evidenced by 3.4-7.5% weight loss x 2 months, moderate fat and muscle loss    INTERVENTIONS  Implementation  Nutrition education for recommended modifications  Enteral Nutrition - Initiate Julia farms 1.4 20 ml/hr, increase by 10 ml/hr q 8 hours to goal 50 ml/lhr + 240 ml H20 q 4 hours = 1200 ml, 1680 kcal, 74 g protein, 188 g cho, 11 g fiber, 888 ml free h20 + flush = 1848 ml H20 total    Goals  Dave TF at goal  Change to bolus feeding when goal continuous rate established  Maintain/gain weight  Regular bowels     Monitoring/Evaluation  Progress toward goals will be monitored and evaluated per protocol.

## 2022-03-30 NOTE — PROGRESS NOTES
Daily Progress Note    Assessment/Plan:     31 year old male with a past medical history of TBI, GJ tube in place, MRSA, spastic tetraplegia, spasticity, anoxic brain injury, acute respiratory failure, acute encephalopathy, aspiration pneumonia of right lower lobe due to vomit, and dysphagia who presents to this ED via private car for evaluation of vomiting.  He is baseline nonverbal and his uncle and mother care for him.  He was admitted with     Recurrent bilateral aspiration pneumonia  - Probably secondary to tube feeding  - Chest x-ray shows diffuse infiltrate throughout the both lungs more on the right side  - Tube feeding held on admission, RD consulted and resuming tube feeds today and will be at goal tomorrow.  - Negative Covid/influenza test  - Continue IV Zosyn for now, consider transition to oral antibiotic tomorrow  - Continue oxygen support     Vomiting etiology is unclear but now resolved  - Patient is on chronic tube feeding for 10 years  - Tube feeding was changed recently about few weeks ago  - CT abdomen showed satisfactory PEG tube position and no bowel obstruction but moderate to severe right lower lobe and mid left lobe pneumonia  -Appreciate RD input     History of traumatic brain injury  - Patient is quadriplegic and nonverbal at baseline about 10.5 years ago  - Patient lives with his grandmother and uncle who are caregivers  - Continue supportive care     Episode of tachycardia/SVT  - Patient had history of previous SVT/tachycardia when he gets stressed especially with blood drawing  - Continue to monitor on telemetry  - Echo shows no acute abnormalities    Elevated lactate: Resolved with IV fluids    Moderate protein calorie malnutrition: RD following        CODE STATUS  - Admitting MD discussed with grandmother who is the POA  - Patient is DNR     COVID STATUS: Negative date: 3/29/2022     VTE prophylaxis:  Enoxaparin (Lovenox) SQ         Code status:No CPR- Do NOT Intubate        Barriers  to Discharge: Hypoxia, IV antibiotics    Disposition: Anticipate discharge back home in 2 days    Subjective:  Laila is nonverbal at baseline.  His uncle, who states he is one of his caretakers, is present today.  He states that he looks much better.  No further vomiting and does not appear short of breath, appears close to his baseline.  Tube feedings just restarted, will continue IV antibiotics for today.  No other HPI/ROS can be done secondary to his baseline nonverbal status.        Current Medications Reviewed via EHR List    Objective:  Vital signs in last 24 hours:  [unfilled]  .prog  Weight:   @THISENCWEIGHTS(1)@  Weight change:   Body mass index is 13.87 kg/m .    Intake/Output last 3 shifts:  I/O last 3 completed shifts:  In: 110 [NG/GT:10; IV Piggyback:100]  Out: -   Intake/Output this shift:  No intake/output data recorded.    Physical Exam:  General: Appears in no clinical distress  CV: Regular rate and rhythm  Lungs: Clear to auscultation bilaterally  Abdomen: No apparent tenderness, soft        Imaging:  Personally Reviewed.  Echocardiogram Complete    Result Date: 3/30/2022  [id number]^[email or web address]^[place] [address]  Name: MARQUISE LESLEE REED MRN: [id number] : 1990 Study Date: 2022 07:49 AM Age: 31 yrs Gender: Male Patient Location: [facility] Reason For Study: Paroxsysmal SVT Ordering Physician: JEFFREY SINGLETON Performed By: AMY  BSA: 1.7 m2 Height: 75 in Weight: 111 lb HR: 74 ______________________________________________________________________________ Procedure Limited Echo Adult. Technically difficult study. Poor acoustic windows. No hemodynamically significant valvular abnormalities on 2D or color flow imaging. There is no comparison study available. ______________________________________________________________________________ Interpretation Summary  1.Left ventricular size, wall motion and function are normal. The  ejection fraction is 60-65%. 2.Normal right ventricle size and systolic function. 3.No hemodynamically significant valvular abnormalities on 2D or color flow imaging , although this was a limited study without complete Doppler assessment of valves performed.. 4.Small anterior pericardial effusion There are no echocardiographic indications of cardiac tamponade. 5.IVC diameter <2.1 cm collapsing >50% with sniff suggests a normal RA pressure of 3 mmHg. 6.There is no comparison study available. ______________________________________________________________________________ I      WMSI = 1.00     % Normal = 100  X - Cannot   0 -                      (2) - Mildly 2 -          Segments  Size Interpret    Hyperkinetic 1 - Normal  Hypokinetic  Hypokinetic  1-2     small                                                    7 -          3-5    moderate 3 - Akinetic 4 -          5 -         6 - Akinetic Dyskinetic   6-14    large              Dyskinetic   Aneurysmal  w/scar       w/scar       15-16   diffuse  Left Ventricle Left ventricular size, wall motion and function are normal. The ejection fraction is 60-65%. There is normal left ventricular wall thickness. Left ventricular diastolic function is normal. No regional wall motion abnormalities noted.  Right Ventricle Normal right ventricle size and systolic function.  Atria Normal left atrial size. Right atrial size is normal. There is no color Doppler evidence of an atrial shunt.  Mitral Valve Mitral valve leaflets appear normal. There is no evidence of mitral stenosis or clinically significant mitral regurgitation. There is no mitral regurgitation noted. There is no mitral valve stenosis.  Tricuspid Valve Tricuspid valve leaflets appear normal. Right ventricle systolic pressure estimate normal. There is trace to mild tricuspid regurgitation. There is no tricuspid stenosis.  Aortic Valve Aortic valve leaflets appear normal. There is no evidence of aortic stenosis or clinically  significant aortic regurgitation. The aortic valve is not well visualized. No aortic regurgitation is present. No aortic stenosis is present.  Pulmonic Valve The pulmonic valve is not well seen, but is grossly normal. The pulmonic valve is not well visualized.  Vessels The aorta root is normal. Normal size ascending aorta. IVC diameter <2.1 cm collapsing >50% with sniff suggests a normal RA pressure of 3 mmHg.  Pericardium Small anterior pericardial effusion. There are no echocardiographic indications of cardiac tamponade.  Rhythm Sinus rhythm was noted.  ______________________________________________________________________________ MMode/2D Measurements & Calculations IVSd: 0.53 cm LVIDd: 3.5 cm LVIDs: 1.7 cm LVPWd: 1.2 cm  FS: 50.7 % LV mass(C)d: 81.0 grams LV mass(C)dI: 47.4 grams/m2 RWT: 0.68  Doppler Measurements & Calculations TR max jerry: 194.1 cm/sec TR max PG: 15.1 mmHg  ______________________________________________________________________________ Report approved by: Zion Stevens 03/30/2022 08:25 AM       XR Chest Port 1 View    Result Date: 3/29/2022  EXAM: XR CHEST PORT 1 VIEW LOCATION: [facility] DATE/TIME: 3/29/2022 12:54 PM INDICATION: cough.  recent recurrent emesis in quadriplegic with gj tube. COMPARISON: 04/23/2019     IMPRESSION: There some mild diffuse infiltrates throughout both lungs greater on the right side. Patient had some similar infiltrates back on 04/23/2019. Uncertain if these are all chronic findings or recurrent infiltrates. Concerning for an acute pneumonia given the appearance.    CT Abdomen Pelvis w Contrast    Result Date: 3/29/2022  EXAM: CT ABDOMEN PELVIS W CONTRAST LOCATION: [facility] DATE/TIME: 3/29/2022 4:02 PM INDICATION: Vomiting, aspiration pneumonia, tachycardia COMPARISON: CT 04/22/2019 TECHNIQUE: CT scan of the abdomen and pelvis was performed following injection of IV contrast. Multiplanar reformats were  obtained. Dose reduction techniques were used. CONTRAST: IsoVue 370 100mL FINDINGS: LOWER CHEST: Moderate to severe right lower lobe and mild left lower lobar consolidation and surrounding patchy airspace opacities. Mild scattered patchy airspace opacities within the right middle lobe. In the right lower lobe there is endobronchial fluid. HEPATOBILIARY: Mild periportal edema. Mild pericholecystic edema/fluid. No gallbladder distention and no radiodense gallstones. No biliary ductal dilatation. PANCREAS: Normal. SPLEEN: Normal. ADRENAL GLANDS: Normal. KIDNEYS/BLADDER: No hydronephrosis. 4 large bladder stones with the largest measuring up to 2.5 cm are similar to the prior exam.. BOWEL: PEG tube with the balloon and tube tip in satisfactory position in the gastric lumen. No adjacent fluid collection. Normal caliber small bowel without inflammatory changes. Normal appendix. Normal colon. Mild stool burden within the sigmoid colon and rectum. No free air or free fluid. LYMPH NODES: Normal. VASCULATURE: Unremarkable. PELVIC ORGANS: Normal. MUSCULOSKELETAL: Osseous demineralization.     IMPRESSION: 1.  Severe right lower lobe and mild to moderate right middle and left lower lobar aspiration pneumonia. No pleural effusion. 2.  No acute findings in the abdomen or pelvis. No evidence of a bowel obstruction or inflammatory process. 3.  Satisfactory PEG tube position. No adjacent fluid collection or extraluminal air. 4.  Stable large bladder stones.      Lab Results:  Personally Reviewed.   Fingerstick Blood Glucose: @[id number](POCGLUFGR:10)@    Last Hbg A1C: No results found for: HGBA1C   Lab Results   Component Value Date    INR 1.39 (H) 03/29/2022             Advanced Care Planning    Time > 35 minutes with greater than 50% of time spent in counseling and coordination of care.    Adrian Campbell MD  Date: 3/30/2022  Time: 10:22 AM  [facility]

## 2022-03-30 NOTE — PLAN OF CARE
Problem: Plan of Care - These are the overarching goals to be used throughout the patient stay.    Goal: Plan of Care Review/Shift Note  Description: The Plan of Care Review/Shift note should be completed every shift.  The Outcome Evaluation is a brief statement about your assessment that the patient is improving, declining, or no change.  This information will be displayed automatically on your shift note.  Outcome: Ongoing, Progressing   Goal Outcome Evaluation:     PRN tylenol and melatonin given during the night to promote sleep, patient slept for  3 hours total.   Alert. Non verbal. Vital signs are stable. Normal sinus rhythm on tele.   Patient is on IV zosyn and continuous NS @75 ml/hr.   Infrequent cough, thin secretion, oral suctioning completed few times.   Patient is NPO, G-tube in place, clamped.  Family (Grand mother at the bedside. Continue to monitor.

## 2022-03-30 NOTE — H&P
Assessment and Plan  Active Problems:    Aspiration pneumonia, unspecified aspiration pneumonia type, unspecified laterality, unspecified part of lung (H)    Vomiting, intractability of vomiting not specified, presence of nausea not specified, unspecified vomiting type    Paroxysmal supraventricular tachycardia (H)    31 year old male with a past medical history of TBI, GJ tube in place, MRSA, spastic tetraplegia, spasticity, anoxic brain injury, acute respiratory failure, acute encephalopathy, aspiration pneumonia of right lower lobe due to vomit, and dysphagia who presents to this ED via private car for evaluation of vomiting.  He was admitted with    Recurrent bilateral aspiration pneumonia  - Probably secondary to tube feeding  - Chest x-ray shows diffuse infiltrate throughout the both lungs more on the right side  - Hold tube feeding for tonight  - Negative Covid/influenza test  - Continue IV Zosyn  - Continue oxygen support    Vomiting etiology is unclear  - Patient is on chronic tube feeding for 10 years  - Tube feeding was changed recently about few weeks ago  - CT abdomen showed satisfactory PEG tube position and no bowel obstruction but moderate to severe right lower lobe and mid left lobe pneumonia  - Nutritional consult to assess for tube feeding    History of traumatic brain injury  - Patient is quadriplegic and nonverbal at baseline about 10.5 years ago  - Patient lives with his grandmother who is the caregiver.  - Continue supportive care    Episode of tachycardia/SVT  - Patient had history of previous SVT/tachycardia when he gets stressed especially with blood drawing  - Continue to monitor on telemetry  - Check echo      CODE STATUS  - Discussed with grandmother who is the POA  - Patient is DNR    COVID STATUS: Negative date: 3/29/2022    VTE prophylaxis:  Enoxaparin (Lovenox) SQ  DIET: Orders Placed This Encounter      NPO for Medical/Clinical Reasons Except for: No  Exceptions      Disposition/Barriers to discharge: IV antibiotics  Code Status:Full Code    HPI: History is limited by patient mental condition mainly taken from caregiver.  Marquise LESLEE Brody is a 31 year old male with a past medical history of TBI, GJ tube in place, MRSA, spastic tetraplegia, spasticity, anoxic brain injury, acute respiratory failure, acute encephalopathy, aspiration pneumonia of right lower lobe due to vomit, and dysphagia who presents to this ED via private car for evaluation of vomiting.  Basically the patient is nonverbal after a TBI 10.5 years ago, and since then is quadriplegics.  Today the patient has been experiencing constant vomiting up food. A couple weeks ago, his 20 size was placed in his GJ tube, and he began to loose weight. Afterward, he saw a provider, and it was replaced by a 22 size. The patient's family is not sure if the vomiting is due to the wrong sized being placed, or build up of food. His grandmother states that his stomach has been swelling above his GJ tube more than normal, where usually it swells more around his GJ tube. In addition, his grandmother states that the patient becomes excited with stimulation. She reports that usually he is smiling and laughing, but today he presents much more serious than normal, with his arms more stiff.  Patient had fever at home but no documented temperature.  In the ER his temperature was 99.4.  Patient was found to have aspiration pneumonia in the ER.    History reviewed. No pertinent past medical history.  Past Surgical History:   Procedure Laterality Date     GASTROSTOMY, INSERT TUBE, COMBINED       PICC  4/22/2019          No family history on file.  Social History     Socioeconomic History     Marital status: Single     Spouse name: Not on file     Number of children: Not on file     Years of education: Not on file     Highest education level: Not on file   Occupational History     Not on file   Tobacco Use     Smoking status:  Never Smoker     Smokeless tobacco: Not on file   Substance and Sexual Activity     Alcohol use: No     Drug use: Not on file     Comment: Drug use: JUNIOR     Sexual activity: Never   Other Topics Concern     Not on file   Social History Narrative    10/30/2016 - Patient's caretaker is his grandmother. Originally from [place].     Social Determinants of Health     Financial Resource Strain: Not on file   Food Insecurity: Not on file   Transportation Needs: Not on file   Physical Activity: Not on file   Stress: Not on file   Social Connections: Not on file   Intimate Partner Violence: Not on file   Housing Stability: Not on file     No Known Allergies    PRIOR TO ADMISSION MEDICATIONS   No medications prior to admission.        REVIEW OF SYSTEMS:  12 point reviewed pertinent negatives and positives in HPI all others negative     PHYSICAL EXAM  B/P:102/53 T:97.6 P:107 R: 19     Head:    Normocephalic, without obvious abnormality, atraumatic   Eyes:    PERRL, conjunctiva/corneas clear, EOM's intact,both eyes    Ears:    Normal external ear canals no drainage or erythema bilat.   Nose:   Nares normal by gross inspection,  mucosa normal, no drainage or sinus tenderness   Throat:   Lips, mucosa, and tongue normal; teeth and gums normal   Neck:   Supple, symmetrical, trachea midline, no adenopathy;        thyroid:  No enlargement/tenderness/nodules   Back:     Symmetric, no curvature, ROM normal, no CVA tenderness   Lungs:    Decreased breath sounds on lung bases with scattered rhonchi on the right lung.  No rales.   Chest wall:    No tenderness or deformity   Heart:    Regular rate and rhythm, S1 and S2 normal, I/VI systolic murmur, no rubs, no JVD, no edema   Abdomen:     Soft, non-tender, bowel sounds active all four quadrants,     no masses, no hepatosplenomegaly, intact tube feeding in place with no signs of infection or bleeding.   Musculoskeletal:   Extremities are warm and non-tender, atraumatic, no joint swelling  or tenderness   Pulses:   2+ and symmetric all extremities   Skin:   Skin color, texture, turgor normal, no rashes or lesions on exposed areas, please see nursing assessment for full skin assessment   Neurologic:  Patient is quadriplegic and nonverbal at baseline, contracted extremities         PERTINENT LABS and RADIOLOGY   Recent Labs   Lab 03/29/22  1418 03/29/22  1243   WBC 6.0  --    HGB 12.7*  --    MCV 95  --      --    INR 1.39*  --    NA  --  136   POTASSIUM  --  4.6   CHLORIDE  --  97*   CO2  --  22   BUN  --  14   CR  --  0.83   ANIONGAP  --  17   MARISA  --  10.0   GLC  --  178*   ALBUMIN  --  3.5   PROTTOTAL  --  8.9*   BILITOTAL  --  0.8   ALKPHOS  --  66   ALT  --  15   AST  --  11   LIPASE  --  21     Recent Results (from the past 24 hour(s))   XR Chest Port 1 View    Narrative    EXAM: XR CHEST PORT 1 VIEW  LOCATION: [facility]  DATE/TIME: 3/29/2022 12:54 PM    INDICATION: cough.  recent recurrent emesis in quadriplegic with gj tube.  COMPARISON: 04/23/2019      Impression    IMPRESSION: There some mild diffuse infiltrates throughout both lungs greater on the right side. Patient had some similar infiltrates back on 04/23/2019. Uncertain if these are all chronic findings or recurrent infiltrates. Concerning for an acute   pneumonia given the appearance.   CT Abdomen Pelvis w Contrast    Narrative    EXAM: CT ABDOMEN PELVIS W CONTRAST  LOCATION: [facility]  DATE/TIME: 3/29/2022 4:02 PM    INDICATION: Vomiting, aspiration pneumonia, tachycardia  COMPARISON: CT 04/22/2019  TECHNIQUE: CT scan of the abdomen and pelvis was performed following injection of IV contrast. Multiplanar reformats were obtained. Dose reduction techniques were used.  CONTRAST: IsoVue 370 100mL    FINDINGS:   LOWER CHEST: Moderate to severe right lower lobe and mild left lower lobar consolidation and surrounding patchy airspace opacities. Mild scattered patchy airspace opacities  within the right middle lobe. In the right lower lobe there is endobronchial   fluid.    HEPATOBILIARY: Mild periportal edema. Mild pericholecystic edema/fluid. No gallbladder distention and no radiodense gallstones. No biliary ductal dilatation.    PANCREAS: Normal.    SPLEEN: Normal.    ADRENAL GLANDS: Normal.    KIDNEYS/BLADDER: No hydronephrosis. 4 large bladder stones with the largest measuring up to 2.5 cm are similar to the prior exam..    BOWEL: PEG tube with the balloon and tube tip in satisfactory position in the gastric lumen. No adjacent fluid collection. Normal caliber small bowel without inflammatory changes. Normal appendix. Normal colon. Mild stool burden within the sigmoid colon   and rectum. No free air or free fluid.    LYMPH NODES: Normal.    VASCULATURE: Unremarkable.    PELVIC ORGANS: Normal.    MUSCULOSKELETAL: Osseous demineralization.      Impression    IMPRESSION:   1.  Severe right lower lobe and mild to moderate right middle and left lower lobar aspiration pneumonia. No pleural effusion.  2.  No acute findings in the abdomen or pelvis. No evidence of a bowel obstruction or inflammatory process.  3.  Satisfactory PEG tube position. No adjacent fluid collection or extraluminal air.  4.  Stable large bladder stones.     EKG: Sinus tachycardia with nonspecific T wave changes.    Discussed with family, and nursing staff     Jose C Perera MD  [facility] Internal Medicine Hospitalist  [phone]

## 2022-03-30 NOTE — CONSULTS
Care Management Initial Consult    General Information  Assessment completed with: Family, Caregiver,  (Angel Luis Pattie)  Type of CM/SW Visit: Offer D/C Planning    Primary Care Provider verified and updated as needed: Yes   Readmission within the last 30 days:        Reason for Consult: discharge planning  Advance Care Planning:            Communication Assessment  Patient's communication style: spoken language (English or Bilingual)    Hearing Difficulty or Deaf: no   Wear Glasses or Blind: no    Cognitive  Cognitive/Neuro/Behavioral: level of consciousness  Level of Consciousness: alert  Arousal Level: arouses to voice  Orientation:  (nonverbal)  Mood/Behavior: calm  Best Language:  (Non verbal)  Speech:  (nonverbal)    Living Environment:   People in home: grandparent(s)     Current living Arrangements: house      Able to return to prior arrangements: yes  Living Arrangement Comments:  (very  loving family)    Family/Social Support:  Care provided by: other (see comments)  Provides care for: no one, unable/limited ability to care for self  Marital Status: Single  Grandparent(s), Other (specify)          Description of Support System: Supportive, Involved    Support Assessment: Adequate family and caregiver support    Current Resources:   Patient receiving home care services: No     Community Resources: County Programs, County Worker, PCA  Equipment currently used at home: walker, standard  Supplies currently used at home: Incontinence Supplies, Nutritional Supplements, Gloves, Wipes    Employment/Financial:  Employment Status: disabled        Financial Concerns: No concerns identified   Referral to Financial Worker: No       Lifestyle & Psychosocial Needs:  Social Determinants of Health     Tobacco Use: Unknown     Smoking Tobacco Use: Never Smoker     Smokeless Tobacco Use: Unknown   Alcohol Use: Not on file   Financial Resource Strain: Not on file   Food Insecurity: Not on file   Transportation Needs: Not on  file   Physical Activity: Not on file   Stress: Not on file   Social Connections: Not on file   Intimate Partner Violence: Not on file   Depression: Not on file   Housing Stability: Not on file       Functional Status:  Prior to admission patient needed assistance:   Dependent ADLs:: Bathing, Dressing, Eating, Grooming, Incontinence, Positioning, Transfers  Dependent IADLs:: Cleaning, Cooking, Laundry, Shopping, Meal Preparation, Medication Management, Money Management, Transportation, Incontinence       Mental Health Status:  Mental Health Status: No Current Concerns       Chemical Dependency Status:  Chemical Dependency Status: No Current Concerns             Values/Beliefs:  Spiritual, Cultural Beliefs, Restorationist Practices, Values that affect care: no               Additional Information:  Pt lives with his grandmother and grandpop who are his primary caregivers along with his uncle Angel Luis. Pt is completely dependent on others for cares. Dietician started pt on TF formula, possible need for [facility]. Grandmother has been pureeing food for pt.   Pt has a new [employer] care manager, but grandmother is unsure of who yet. Family to transport.       CARO Mays

## 2022-03-30 NOTE — PLAN OF CARE
"  Problem: Plan of Care - These are the overarching goals to be used throughout the patient stay.    Goal: Plan of Care Review/Shift Note  Description: The Plan of Care Review/Shift note should be completed every shift.  The Outcome Evaluation is a brief statement about your assessment that the patient is improving, declining, or no change.  This information will be displayed automatically on your shift note.  Outcome: Ongoing, Progressing  Goal: Patient-Specific Goal (Individualized)  Description: You can add care plan individualizations to a care plan. Examples of Individualization might be:  \"Parent requests to be called daily at 9am for status\", \"I have a hard time hearing out of my right ear\", or \"Do not touch me to wake me up as it startles me\".  Outcome: Ongoing, Progressing  Goal: Absence of Hospital-Acquired Illness or Injury  Outcome: Ongoing, Progressing  Intervention: Identify and Manage Fall Risk  Recent Flowsheet Documentation  Taken 3/29/2022 1900 by Betsy Carrington RN  Safety Promotion/Fall Prevention: bed alarm on  Intervention: Prevent Skin Injury  Recent Flowsheet Documentation  Taken 3/29/2022 1900 by Betsy Carrington RN  Body Position: turned  Goal: Optimal Comfort and Wellbeing  Outcome: Ongoing, Progressing  Goal: Readiness for Transition of Care  Outcome: Ongoing, Progressing  Intervention: Mutually Develop Transition Plan  Recent Flowsheet Documentation  Taken 3/29/2022 1800 by Betsy Carrington RN  Equipment Currently Used at Home: none   Goal Outcome Evaluation:           Pt is alert to self. Pt came to the unit at around 1830 from Er. Pt's grandmother is at the bed side and helping with the admission. Pt has ivx2 and is patent. Pt  was inc x2.patient is nonverbal. Pt's gt is patent. Continue to monitor the pt.           "

## 2022-03-31 LAB — HGB BLD-MCNC: 10.9 G/DL (ref 13.3–17.7)

## 2022-03-31 PROCEDURE — C9113 INJ PANTOPRAZOLE SODIUM, VIA: HCPCS | Performed by: INTERNAL MEDICINE

## 2022-03-31 PROCEDURE — 36415 COLL VENOUS BLD VENIPUNCTURE: CPT | Performed by: EMERGENCY MEDICINE

## 2022-03-31 PROCEDURE — 258N000003 HC RX IP 258 OP 636: Performed by: INTERNAL MEDICINE

## 2022-03-31 PROCEDURE — 99233 SBSQ HOSP IP/OBS HIGH 50: CPT | Performed by: EMERGENCY MEDICINE

## 2022-03-31 PROCEDURE — 250N000013 HC RX MED GY IP 250 OP 250 PS 637: Performed by: INTERNAL MEDICINE

## 2022-03-31 PROCEDURE — 250N000011 HC RX IP 250 OP 636: Performed by: INTERNAL MEDICINE

## 2022-03-31 PROCEDURE — 250N000013 HC RX MED GY IP 250 OP 250 PS 637: Performed by: EMERGENCY MEDICINE

## 2022-03-31 PROCEDURE — 120N000001 HC R&B MED SURG/OB

## 2022-03-31 PROCEDURE — 85018 HEMOGLOBIN: CPT | Performed by: EMERGENCY MEDICINE

## 2022-03-31 RX ADMIN — AMOXICILLIN AND CLAVULANATE POTASSIUM 1 TABLET: 875; 125 TABLET, FILM COATED ORAL at 20:42

## 2022-03-31 RX ADMIN — PANTOPRAZOLE SODIUM 40 MG: 40 INJECTION, POWDER, FOR SOLUTION INTRAVENOUS at 07:54

## 2022-03-31 RX ADMIN — PIPERACILLIN AND TAZOBACTAM 3.38 G: 3; .375 INJECTION, POWDER, LYOPHILIZED, FOR SOLUTION INTRAVENOUS at 05:27

## 2022-03-31 RX ADMIN — ENOXAPARIN SODIUM 30 MG: 30 INJECTION SUBCUTANEOUS at 20:39

## 2022-03-31 RX ADMIN — ACETAMINOPHEN 650 MG: 325 SOLUTION ORAL at 23:52

## 2022-03-31 RX ADMIN — SODIUM CHLORIDE: 9 INJECTION, SOLUTION INTRAVENOUS at 20:44

## 2022-03-31 RX ADMIN — ACETAMINOPHEN 650 MG: 650 SUPPOSITORY RECTAL at 12:26

## 2022-03-31 RX ADMIN — AMOXICILLIN AND CLAVULANATE POTASSIUM 1 TABLET: 875; 125 TABLET, FILM COATED ORAL at 11:39

## 2022-03-31 ASSESSMENT — ACTIVITIES OF DAILY LIVING (ADL)
ADLS_ACUITY_SCORE: 22

## 2022-03-31 NOTE — PROGRESS NOTES
[facility]    Writer attempted to call and speak with pt's grandmother, but was unsuccessful.  Writer would like to set up an enteral teaching session for tomorrow, 4/1, to educ pt's grandmother on the enteral pump and home enteral plan. Writer will continue to try to reach pt's grandmother.  If unsuccessful, will try to connect with patient/grandmother in person, while rounding at [place].     Thank you for the referral.    Kristen Katz RN, BSN  [facility] Liaison  [phone] (Mon through Fri, 8:00 am-5:00 pm)  [phone] (Office)

## 2022-03-31 NOTE — PLAN OF CARE
Problem: Plan of Care - These are the overarching goals to be used throughout the patient stay.    Goal: Optimal Comfort and Wellbeing  Outcome: Ongoing, Progressing     Pt quadriplegic, nonverbal, total cares. Seems comfortable, family allowed to stay overnight. Tele: NSR.    G-tube feeding rate increased to 40 ml/hour at 0400. Gastric residual 40 ml.  Feeding rate to be increased by nursing staff at 1200 to final rate of 50 ml/hour.

## 2022-03-31 NOTE — PROGRESS NOTES
CLINICAL NUTRITION SERVICES - REASSESSMENT NOTE     Nutrition Prescription    RECOMMENDATIONS FOR MDs/PROVIDERS TO ORDER:  None    Malnutrition Status:    Moderate in the context of chronic illness    Recommendations already ordered by Registered Dietitian (RD):  Change to syringe feeding  Goal: [employer] 1.4, 4 containers/day; 210 ml (7 oz) q 4 hours= 1300 ml, 1820 kcal, 80 g protein, 12 g fiber, 936 ml free H2) + 75 ml flush before and after each feeding = 1836 ml H20 total.     Turn TF off at 1400. Start 1/2 of first bolus for first feeding at 1600. Increase to goal bolus 210 ml at 2nd feeding     Future/Additional Recommendations:  Adjust tF pending tolerance, weight           CURRENT NUTRITION ORDERS  Diet: NPO  Nutrition Support: GT in place, 22 Frisian  Enteral Nutrition -[employer] 1.4 at goal 50 ml/lhr as of 1200 today + 240 ml H20 q 4 hours = 1200 ml, 1680 kcal, 74 g protein, 188 g cho, 11 g fiber, 888 ml free h20 + flush = 1848 ml H20 total    Uncle feels pt is tolerating TF since he arrived at 9 am today. Pt with mild temp. Pt did wake and start moaning during visit. Discussed next transition plan to change to bolus syringe feeding per family preferred feeding schedule.   Uncle reports they were giving pt 1-3 cups volume of food at a meal, depending on how hungry pt indicated he was,  q 4 hours     NEW FINDINGS  -[facility] assessed and pt is covered 100% for  Feeding and supplies. They plan to provide enteral education in hospital before discharge.   -Discussed family request of [facility] FT placement with MD -not appropriate for this stay as it is not medically necessary.     LABS  Labs reviewed    MEDICATIONS  Medications reviewed  Iv protonix, iv abx changed to oral    Dosing Weight: 50.3 kg    ASSESSED NUTRITION NEEDS  *Difficult to determine calorie provisions/needs at home d/t blended food being provided  Estimated Energy Needs: 8728-5117 kcals/day (30 - 35 kcals/kg )  Justification:  Underweight  Estimated Protein Needs: 50-60 grams protein/day (1 - 1.2 grams of pro/kg)  Justification: Increased needs, hypercatabolism with acute illness  Estimated Fluid Needs: 1260 + mL/day (25 ml +)   Justification: Maintenance    GI  BM yesterday    INTERVENTIONS  Implementation  Change to syringe feeding  Goal: [employer] 1.4, 4 containers/day; 210 ml (7 oz) q 4 hours= 1300 ml, 1820 kcal, 80 g protein, 12 g fiber, 936 ml free H2) + 75 ml flush before and after each feeding = 1836 ml H20 total.     Turn TF off at 1400. Start 1/2 of first bolus for first feeding at 1600. Increase to goal bolus 210 ml at 2nd feeding     Goals  Dave TF at goal- progressing  Change to bolus feeding when goal continuous rate established- met today  Maintain/gain weight- in progress  Regular bowels - progressing     Monitoring/Evaluation  Progress toward goals will be monitored and evaluated per protocol.

## 2022-03-31 NOTE — PROGRESS NOTES
Daily Progress Note    Assessment/Plan:  31 year old male with a past medical history of TBI, GJ tube in place, MRSA, spastic tetraplegia, spasticity, anoxic brain injury, acute respiratory failure, acute encephalopathy, admitted with aspiration pneumonia. He is baseline nonverbal and his uncle and mother care for him.     Recurrent bilateral aspiration pneumonia  - Probably secondary to tube feeding.  Tube feeds were initially held then resumed March 30 and will be at goal today.  No evidence of recurrent aspiration currently and his mother states that he appears clinically back to his baseline regarding his breathing and alertness.  - Chest x-ray shows diffuse infiltrate throughout the both lungs more on the right side  -Appreciate RD input  - Negative Covid/influenza test  -We will transition from IV Zosyn to Augmentin via feeding tube today  -Not requiring oxygen     Vomiting etiology is unclear but now resolved  - Patient is on chronic tube feeding for 10 years  - Tube feeding was changed recently about few weeks ago  - CT abdomen showed satisfactory PEG tube position and no bowel obstruction but moderate to severe right lower lobe and mid left lobe pneumonia  -Appreciate RD input     History of traumatic brain injury  - Patient is quadriplegic and nonverbal at baseline about 10.5 years ago  - Patient lives with his grandmother  who is his caretaker, his uncle also apparently helps  - Continue supportive care     Episode of tachycardia/SVT  - Patient had history of previous SVT/tachycardia when he gets stressed especially with blood drawing  - Continue to monitor on telemetry  - Echo shows no acute abnormalities        CODE STATUS  - Admitting MD discussed with grandmother who is the POA  - Patient is DNR     COVID STATUS: Negative date: 3/29/2022     VTE prophylaxis:  Enoxaparin (Lovenox) subcutaneous     Code status:No CPR- Do NOT Intubate        Barriers to Discharge: Assess tube tube feeding regimen,  transition off IV antibiotics    Disposition: Anticipate discharge home under the care of his mother in 1 to 2 days    Subjective:   grandmother is present today.  She states that he looks back to his normal baseline self with normal breathing and normal alertness.  His vitals are stable and he has no hypoxia.  I discussed with her the plan of transitioning off IV antibiotics and see how he does at his goal tube feeding level.        Current Medications Reviewed via EHR List    Objective:  Vital signs in last 24 hours:  [unfilled]  .prog  Weight:   @THISENCWEIGHTS(1)@  Weight change:   Body mass index is 13.87 kg/m .    Intake/Output last 3 shifts:  I/O last 3 completed shifts:  In: 660 [NG/GT:660]  Out: -   Intake/Output this shift:  No intake/output data recorded.    Physical Exam:  General: He is sleeping and appears in no distress  CV: Rate rate rhythm  Lungs: Clear to auscultation  Abdomen: Soft        Imaging:  Personally Reviewed.  Echocardiogram Complete    Result Date: 3/30/2022  [id number]^ARELI^[place] [address]  Name: MARQUISE LESLEE REED MRN: [id number] : 1990 Study Date: 2022 07:49 AM Age: 31 yrs Gender: Male Patient Location: [facility] Reason For Study: Paroxsysmal SVT Ordering Physician: JEFFREY SINGLETON Performed By: AMY  BSA: 1.7 m2 Height: 75 in Weight: 111 lb HR: 74 ______________________________________________________________________________ Procedure Limited Echo Adult. Technically difficult study. Poor acoustic windows. No hemodynamically significant valvular abnormalities on 2D or color flow imaging. There is no comparison study available. ______________________________________________________________________________ Interpretation Summary  1.Left ventricular size, wall motion and function are normal. The ejection fraction is 60-65%. 2.Normal right ventricle size and systolic function. 3.No hemodynamically  significant valvular abnormalities on 2D or color flow imaging , although this was a limited study without complete Doppler assessment of valves performed.. 4.Small anterior pericardial effusion There are no echocardiographic indications of cardiac tamponade. 5.IVC diameter <2.1 cm collapsing >50% with sniff suggests a normal RA pressure of 3 mmHg. 6.There is no comparison study available. ______________________________________________________________________________ I      WMSI = 1.00     % Normal = 100  X - Cannot   0 -                      (2) - Mildly 2 -          Segments  Size Interpret    Hyperkinetic 1 - Normal  Hypokinetic  Hypokinetic  1-2     small                                                    7 -          3-5    moderate 3 - Akinetic 4 -          5 -         6 - Akinetic Dyskinetic   6-14    large              Dyskinetic   Aneurysmal  w/scar       w/scar       15-16   diffuse  Left Ventricle Left ventricular size, wall motion and function are normal. The ejection fraction is 60-65%. There is normal left ventricular wall thickness. Left ventricular diastolic function is normal. No regional wall motion abnormalities noted.  Right Ventricle Normal right ventricle size and systolic function.  Atria Normal left atrial size. Right atrial size is normal. There is no color Doppler evidence of an atrial shunt.  Mitral Valve Mitral valve leaflets appear normal. There is no evidence of mitral stenosis or clinically significant mitral regurgitation. There is no mitral regurgitation noted. There is no mitral valve stenosis.  Tricuspid Valve Tricuspid valve leaflets appear normal. Right ventricle systolic pressure estimate normal. There is trace to mild tricuspid regurgitation. There is no tricuspid stenosis.  Aortic Valve Aortic valve leaflets appear normal. There is no evidence of aortic stenosis or clinically significant aortic regurgitation. The aortic valve is not well visualized. No aortic regurgitation is  present. No aortic stenosis is present.  Pulmonic Valve The pulmonic valve is not well seen, but is grossly normal. The pulmonic valve is not well visualized.  Vessels The aorta root is normal. Normal size ascending aorta. IVC diameter <2.1 cm collapsing >50% with sniff suggests a normal RA pressure of 3 mmHg.  Pericardium Small anterior pericardial effusion. There are no echocardiographic indications of cardiac tamponade.  Rhythm Sinus rhythm was noted.  ______________________________________________________________________________ MMode/2D Measurements & Calculations IVSd: 0.53 cm LVIDd: 3.5 cm LVIDs: 1.7 cm LVPWd: 1.2 cm  FS: 50.7 % LV mass(C)d: 81.0 grams LV mass(C)dI: 47.4 grams/m2 RWT: 0.68  Doppler Measurements & Calculations TR max jerry: 194.1 cm/sec TR max PG: 15.1 mmHg  ______________________________________________________________________________ Report approved by: Zion Stevens 03/30/2022 08:25 AM       XR Chest Port 1 View    Result Date: 3/29/2022  EXAM: XR CHEST PORT 1 VIEW LOCATION: [facility] DATE/TIME: 3/29/2022 12:54 PM INDICATION: cough.  recent recurrent emesis in quadriplegic with gj tube. COMPARISON: 04/23/2019     IMPRESSION: There some mild diffuse infiltrates throughout both lungs greater on the right side. Patient had some similar infiltrates back on 04/23/2019. Uncertain if these are all chronic findings or recurrent infiltrates. Concerning for an acute pneumonia given the appearance.    CT Abdomen Pelvis w Contrast    Result Date: 3/29/2022  EXAM: CT ABDOMEN PELVIS W CONTRAST LOCATION: [facility] DATE/TIME: 3/29/2022 4:02 PM INDICATION: Vomiting, aspiration pneumonia, tachycardia COMPARISON: CT 04/22/2019 TECHNIQUE: CT scan of the abdomen and pelvis was performed following injection of IV contrast. Multiplanar reformats were obtained. Dose reduction techniques were used. CONTRAST: IsoVue 370 100mL FINDINGS: LOWER CHEST: Moderate to  severe right lower lobe and mild left lower lobar consolidation and surrounding patchy airspace opacities. Mild scattered patchy airspace opacities within the right middle lobe. In the right lower lobe there is endobronchial fluid. HEPATOBILIARY: Mild periportal edema. Mild pericholecystic edema/fluid. No gallbladder distention and no radiodense gallstones. No biliary ductal dilatation. PANCREAS: Normal. SPLEEN: Normal. ADRENAL GLANDS: Normal. KIDNEYS/BLADDER: No hydronephrosis. 4 large bladder stones with the largest measuring up to 2.5 cm are similar to the prior exam.. BOWEL: PEG tube with the balloon and tube tip in satisfactory position in the gastric lumen. No adjacent fluid collection. Normal caliber small bowel without inflammatory changes. Normal appendix. Normal colon. Mild stool burden within the sigmoid colon and rectum. No free air or free fluid. LYMPH NODES: Normal. VASCULATURE: Unremarkable. PELVIC ORGANS: Normal. MUSCULOSKELETAL: Osseous demineralization.     IMPRESSION: 1.  Severe right lower lobe and mild to moderate right middle and left lower lobar aspiration pneumonia. No pleural effusion. 2.  No acute findings in the abdomen or pelvis. No evidence of a bowel obstruction or inflammatory process. 3.  Satisfactory PEG tube position. No adjacent fluid collection or extraluminal air. 4.  Stable large bladder stones.      Lab Results:  Personally Reviewed.   Fingerstick Blood Glucose: @[id number](POCGLUFGR:10)@    Last Hbg A1C: No results found for: HGBA1C   Lab Results   Component Value Date    INR 1.39 (H) 03/29/2022     No results found for this or any previous visit (from the past 24 hour(s)).        Advanced Care Planning    Time > 35 minutes with greater than 50% of time spent in counseling and coordination of care.    Adrian Campbell MD  Date: 3/31/2022  Time: 9:11 AM  [facility]

## 2022-03-31 NOTE — PLAN OF CARE
Problem: Plan of Care - These are the overarching goals to be used throughout the patient stay.    Goal: Plan of Care Review/Shift Note  Description: The Plan of Care Review/Shift note should be completed every shift.  The Outcome Evaluation is a brief statement about your assessment that the patient is improving, declining, or no change.  This information will be displayed automatically on your shift note.  Outcome: Ongoing, Progressing   Goal Outcome Evaluation:           Pt at goal rate for TF, tolerating ok. Pt now on oral abx through tube. Pt has been repositioned and is incontinent.

## 2022-03-31 NOTE — PROGRESS NOTES
RENETTA received a call from  home infusion, pt insurance will cover pt pump and enteral feedings at 100%. RENETTA called pt grandmother with update. Pattie grateful for the news. She will contact  home infusion liaison for training. RENETTA provided her with Kristen's phone number.      CARO Mays

## 2022-03-31 NOTE — PLAN OF CARE
Problem: Range of Motion Impairment  Goal: Optimal Range of Motion  Outcome: Ongoing, Not Progressing     Problem: Mobility Impairment  Goal: Optimal Mobility  Outcome: Ongoing, Not Progressing     Problem: Malnutrition  Goal: Improved Nutritional Intake  Outcome: Ongoing, Not Progressing   Goal Outcome Evaluation:    Patient full care, immobile and non responsive. NPO, all meds and nutrition through Gtube, Gtube currently running 30 ml/hr of [employer] 1.4. Brought up to 30 ml/hr at 2000. To be brought up another 10 ml/hr at 0400, will pass on to overnight crew.   Patient on tele, running normal sinus rhythm.  Patient running 75 ml/hr NS.   Family allowed to stay in room overnight, as okayed by nursing supervisor.

## 2022-04-01 ENCOUNTER — HOME INFUSION (PRE-WILLOW HOME INFUSION) (OUTPATIENT)
Dept: PHARMACY | Facility: CLINIC | Age: 32
End: 2022-04-01

## 2022-04-01 VITALS
BODY MASS INDEX: 13.8 KG/M2 | HEART RATE: 78 BPM | WEIGHT: 111 LBS | DIASTOLIC BLOOD PRESSURE: 60 MMHG | TEMPERATURE: 97.1 F | RESPIRATION RATE: 18 BRPM | SYSTOLIC BLOOD PRESSURE: 126 MMHG | OXYGEN SATURATION: 98 % | HEIGHT: 75 IN

## 2022-04-01 LAB
CREAT SERPL-MCNC: 0.49 MG/DL (ref 0.7–1.3)
GFR SERPL CREATININE-BSD FRML MDRD: >90 ML/MIN/1.73M2
PLATELET # BLD AUTO: 160 10E3/UL (ref 150–450)

## 2022-04-01 PROCEDURE — 85049 AUTOMATED PLATELET COUNT: CPT | Performed by: INTERNAL MEDICINE

## 2022-04-01 PROCEDURE — 82565 ASSAY OF CREATININE: CPT | Performed by: INTERNAL MEDICINE

## 2022-04-01 PROCEDURE — 99239 HOSP IP/OBS DSCHRG MGMT >30: CPT | Performed by: INTERNAL MEDICINE

## 2022-04-01 PROCEDURE — 36415 COLL VENOUS BLD VENIPUNCTURE: CPT | Performed by: STUDENT IN AN ORGANIZED HEALTH CARE EDUCATION/TRAINING PROGRAM

## 2022-04-01 PROCEDURE — 250N000013 HC RX MED GY IP 250 OP 250 PS 637: Performed by: EMERGENCY MEDICINE

## 2022-04-01 PROCEDURE — 87040 BLOOD CULTURE FOR BACTERIA: CPT | Performed by: STUDENT IN AN ORGANIZED HEALTH CARE EDUCATION/TRAINING PROGRAM

## 2022-04-01 RX ADMIN — AMOXICILLIN AND CLAVULANATE POTASSIUM 1 TABLET: 875; 125 TABLET, FILM COATED ORAL at 08:19

## 2022-04-01 RX ADMIN — Medication 40 MG: at 08:19

## 2022-04-01 ASSESSMENT — ACTIVITIES OF DAILY LIVING (ADL)
ADLS_ACUITY_SCORE: 22

## 2022-04-01 NOTE — DISCHARGE SUMMARY
[facility]  Hospitalist Discharge Summary      Date of Admission:  3/29/2022  Date of Discharge:  4/1/2022  2:41 PM  Discharging Provider: Nona Khan DO  Discharge Service: Hospitalist Service    Discharge Diagnoses   Recurrent bilateral aspiration pneumonia  Vomiting  Traumatic brain injury-history of  Episodes of tachycardia/SVT  Chronic GJ tube in place  Lactic acidosis    Follow-ups Needed After Discharge   Follow-up Appointments     Follow-up and recommended labs and tests       Follow up with primary care provider, Sulma Joe, within 7 days for   hospital follow- up.             Unresulted Labs Ordered in the Past 30 Days of this Admission     Date and Time Order Name Status Description    3/31/2022 11:34 PM Blood Culture Hand, Right In process     3/29/2022 12:33 PM Blood Culture Peripheral Blood Preliminary     3/29/2022 12:33 PM Blood Culture Peripheral Blood Preliminary       These results will be followed up by [facility]    Discharge Disposition   Discharged to home  Condition at discharge: Stable      Hospital Course   31 year old male with a past medical history of TBI, GJ tube in place, MRSA, spastic tetraplegia, spasticity, anoxic brain injury, acute respiratory failure, acute encephalopathy, admitted with aspiration pneumonia. He is baseline nonverbal and his uncle and mother care for him.     Recurrent bilateral aspiration pneumonia  - Probably secondary to tube feeding.  Tube feeds were initially held then resumed March 30 and are now at goal. No evidence of recurrent aspiration currently and his    - Mother states that he appears clinically back to his baseline regarding his breathing and alertness.  - Chest x-ray shows diffuse infiltrate throughout the both lungs more on the right side  - Negative Covid/influenza test  -Augmentin at discharge      Vomiting etiology is unclear but now resolved  - Patient is on chronic tube feeding for 10 years  - Tube feeding  was changed recently about a few weeks ago  - CT abdomen showed satisfactory PEG tube position and no bowel obstruction but moderate to severe right lower lobe and mid left lobe pneumonia     History of traumatic brain injury  - Patient is quadriplegic and nonverbal at baseline about 10.5 years ago  - Patient lives with his grandmother  who is his caretaker, his uncle also apparently helps  - Continue supportive care     Episode of tachycardia/SVT  - Patient had history of previous SVT/tachycardia when he gets stressed especially with blood drawing  - Continue to monitor on telemetry  - Echo shows no acute abnormalities    Moderate malnutrition    Lactic acidosis-resolved    Consultations This Hospital Stay   NUTRITION SERVICES ADULT IP CONSULT  PHARMACY IP CONSULT  SOCIAL WORK IP CONSULT    Code Status   No CPR- Do NOT Intubate    Time Spent on this Encounter   I, Nona Khan DO, personally saw the patient today and spent greater than 30 minutes discharging this patient.       Nona Khan DO  [address] [address]  Phone: [phone]  Fax: [phone]  ______________________________________________________________________    Physical Exam   Vital Signs: Temp: 97.1  F (36.2  C) Temp src: Axillary BP: 126/60 Pulse: 78   Resp: 18 SpO2: 98 % O2 Device: None (Room air)    Weight: 111 lbs 0 oz  GENRL: Nonverbal.  Eyes closed during exam.  Not in acute distress. Lying in bed   HEENT: no lymphadenopathy or thyromegaly  CHEST: Clear to auscultation bilaterally. No wheezes  HEART: Regular rate   ABDMN: Soft. Thin, Non-tender, non-distended. No organomegaly. No guarding or rigidity. Bowel sounds present   EXTRM: No pedal edema, DP pulses 2+.  Muscle wasting.    NEURO: Quadriplegic.  Not following commands throughout exam.  Contracted upper extremities  PSYCH: Unable to assess secondary to nonverbal status.    INTGM: No skin rash, no cyanosis  or clubbing       Primary Care Physician   Sulma Joe    Discharge Orders      Reason for your hospital stay    Aspiration pneumonia     Follow-up and recommended labs and tests     Follow up with primary care provider, Sulma Joe, within 7 days for hospital follow- up.     Activity    Your activity upon discharge: activity as tolerated     Diet    Follow this diet upon discharge: Orders Placed This Encounter      Adult Formula Bolus Feeding: Q4H [employer] Standard 1.4; Route: Gastrostomy; 4; Can(s); Medication - Feeding Tube Flush Frequency: At least 15-30 mL water before and after medication administration and with tube clogging; Yes; 105 ml bolus 1st fe...      NPO for Medical/Clinical Reasons Except for: NPO but receiving Tube Feeding       Significant Results and Procedures   Most Recent 3 CBC's:Recent Labs   Lab Test 04/01/22  0805 03/31/22  1045 03/30/22  0649 03/29/22  1418 04/24/19  0546   WBC  --   --  7.6 6.0 9.0   HGB  --  10.9* 10.9* 12.7* 11.7*   MCV  --   --  96 95 91     --  155 179 123*     Most Recent 3 BMP's:Recent Labs   Lab Test 04/01/22  0805 03/30/22  0649 03/29/22  1243 04/24/19  0546   NA  --  138 136 141   POTASSIUM  --  3.8 4.6 4.1   CHLORIDE  --  108* 97* 109*   CO2  --  25 22 26   BUN  --  12 14 10   CR 0.49* 0.63* 0.83 0.64*   ANIONGAP  --  5 17 6   MARISA  --  8.7 10.0 9.0   GLC  --  83 178* 101     Most Recent 2 LFT's:Recent Labs   Lab Test 03/30/22  0649 03/29/22  1243   AST 13 11   ALT 11 15   ALKPHOS 49 66   BILITOTAL 0.5 0.8   ,   Results for orders placed or performed during the hospital encounter of 03/29/22   CT Abdomen Pelvis w Contrast    Narrative    EXAM: CT ABDOMEN PELVIS W CONTRAST  LOCATION: [facility]  DATE/TIME: 3/29/2022 4:02 PM    INDICATION: Vomiting, aspiration pneumonia, tachycardia  COMPARISON: CT 04/22/2019  TECHNIQUE: CT scan of the abdomen and pelvis was performed following injection of IV contrast. Multiplanar reformats  were obtained. Dose reduction techniques were used.  CONTRAST: IsoVue 370 100mL    FINDINGS:   LOWER CHEST: Moderate to severe right lower lobe and mild left lower lobar consolidation and surrounding patchy airspace opacities. Mild scattered patchy airspace opacities within the right middle lobe. In the right lower lobe there is endobronchial   fluid.    HEPATOBILIARY: Mild periportal edema. Mild pericholecystic edema/fluid. No gallbladder distention and no radiodense gallstones. No biliary ductal dilatation.    PANCREAS: Normal.    SPLEEN: Normal.    ADRENAL GLANDS: Normal.    KIDNEYS/BLADDER: No hydronephrosis. 4 large bladder stones with the largest measuring up to 2.5 cm are similar to the prior exam..    BOWEL: PEG tube with the balloon and tube tip in satisfactory position in the gastric lumen. No adjacent fluid collection. Normal caliber small bowel without inflammatory changes. Normal appendix. Normal colon. Mild stool burden within the sigmoid colon   and rectum. No free air or free fluid.    LYMPH NODES: Normal.    VASCULATURE: Unremarkable.    PELVIC ORGANS: Normal.    MUSCULOSKELETAL: Osseous demineralization.      Impression    IMPRESSION:   1.  Severe right lower lobe and mild to moderate right middle and left lower lobar aspiration pneumonia. No pleural effusion.  2.  No acute findings in the abdomen or pelvis. No evidence of a bowel obstruction or inflammatory process.  3.  Satisfactory PEG tube position. No adjacent fluid collection or extraluminal air.  4.  Stable large bladder stones.   XR Chest Port 1 View    Narrative    EXAM: XR CHEST PORT 1 VIEW  LOCATION: [facility]  DATE/TIME: 3/29/2022 12:54 PM    INDICATION: cough.  recent recurrent emesis in quadriplegic with gj tube.  COMPARISON: 04/23/2019      Impression    IMPRESSION: There some mild diffuse infiltrates throughout both lungs greater on the right side. Patient had some similar infiltrates back on 04/23/2019.  Uncertain if these are all chronic findings or recurrent infiltrates. Concerning for an acute   pneumonia given the appearance.   Echocardiogram Complete     Value    LVEF  60-65%    [facility]    [id number]  [id number]  [id number]  [id number]^ARELI^JEFFREY^[place] [address]     Name: MARQUISE LESLEE REED  MRN: [id number]  : 1990  Study Date: 2022 07:49 AM  Age: 31 yrs  Gender: Male  Patient Location: [facility]  Reason For Study: Paroxsysmal SVT  Ordering Physician: JEFFREY SINGLETON  Performed By: AMY     BSA: 1.7 m2  Height: 75 in  Weight: 111 lb  HR: 74  ______________________________________________________________________________  Procedure  Limited Echo Adult. Technically difficult study. Poor acoustic windows. No  hemodynamically significant valvular abnormalities on 2D or color flow  imaging. There is no comparison study available.  ______________________________________________________________________________  Interpretation Summary     1.Left ventricular size, wall motion and function are normal. The ejection  fraction is 60-65%.  2.Normal right ventricle size and systolic function.  3.No hemodynamically significant valvular abnormalities on 2D or color flow  imaging , although this was a limited study without complete Doppler  assessment of valves performed..  4.Small anterior pericardial effusion There are no echocardiographic  indications of cardiac tamponade.  5.IVC diameter <2.1 cm collapsing >50% with sniff suggests a normal RA  pressure of 3 mmHg.  6.There is no comparison study available.  ______________________________________________________________________________  I      WMSI = 1.00     % Normal = 100     X - Cannot   0 -                      (2) - Mildly 2 -          Segments  Size  Interpret    Hyperkinetic 1 - Normal  Hypokinetic  Hypokinetic  1-2     small                                                     7 -          3-5      moderate  3 - Akinetic 4  -          5 -         6 - Akinetic Dyskinetic   6-14    large               Dyskinetic   Aneurysmal  w/scar       w/scar       15-16   diffuse     Left Ventricle  Left ventricular size, wall motion and function are normal. The ejection  fraction is 60-65%. There is normal left ventricular wall thickness. Left  ventricular diastolic function is normal. No regional wall motion  abnormalities noted.     Right Ventricle  Normal right ventricle size and systolic function.     Atria  Normal left atrial size. Right atrial size is normal. There is no color  Doppler evidence of an atrial shunt.     Mitral Valve  Mitral valve leaflets appear normal. There is no evidence of mitral stenosis  or clinically significant mitral regurgitation. There is no mitral  regurgitation noted. There is no mitral valve stenosis.     Tricuspid Valve  Tricuspid valve leaflets appear normal. Right ventricle systolic pressure  estimate normal. There is trace to mild tricuspid regurgitation. There is no  tricuspid stenosis.     Aortic Valve  Aortic valve leaflets appear normal. There is no evidence of aortic stenosis  or clinically significant aortic regurgitation. The aortic valve is not well  visualized. No aortic regurgitation is present. No aortic stenosis is present.     Pulmonic Valve  The pulmonic valve is not well seen, but is grossly normal. The pulmonic valve  is not well visualized.     Vessels  The aorta root is normal. Normal size ascending aorta. IVC diameter <2.1 cm  collapsing >50% with sniff suggests a normal RA pressure of 3 mmHg.     Pericardium  Small anterior pericardial effusion. There are no echocardiographic  indications of cardiac tamponade.     Rhythm  Sinus rhythm was noted.     ______________________________________________________________________________  MMode/2D Measurements & Calculations  IVSd: 0.53 cm  LVIDd: 3.5 cm  LVIDs: 1.7 cm  LVPWd: 1.2 cm     FS: 50.7 %  LV mass(C)d: 81.0 grams  LV mass(C)dI: 47.4  grams/m2  RWT: 0.68     Doppler Measurements & Calculations  TR max jerry: 194.1 cm/sec  TR max PG: 15.1 mmHg     ______________________________________________________________________________  Report approved by: Zion Stevens 03/30/2022 08:25 AM               Discharge Medications   Discharge Medication List as of 4/1/2022 12:18 PM      START taking these medications    Details   amoxicillin-clavulanate (AUGMENTIN) 875-125 MG tablet 1 tablet by Per Feeding Tube route every 12 hours for 7 days, Disp-14 tablet, R-0, E-Prescribe           Allergies   No Known Allergies

## 2022-04-01 NOTE — PLAN OF CARE
Goal Outcome Evaluation:      Pt discharged to home, with family that has cared for him previously.  Reviewed discharge paperwork with Grandmother/POA.  Also received antibiotic from pharmacy.  Pt left via own w/c and private car with Grandma and Uncle.

## 2022-04-01 NOTE — PLAN OF CARE
Problem: Range of Motion Impairment  Goal: Optimal Range of Motion  Outcome: Ongoing, Not Progressing     Problem: Mobility Impairment  Goal: Optimal Mobility  Outcome: Ongoing, Not Progressing     Problem: Malnutrition  Goal: Improved Nutritional Intake  Outcome: Ongoing, Progressing   Goal Outcome Evaluation:        Patient on bolus feeding. 105 ml bolus feeding at 16:30, tolerated it well. Bolus feeding at 20:30 increased to 210 ml [employer]. Patient tolerated  well. Patient no longer on IV ABX, now on oral ABX through G tube. VSS this shift, no bowel movement this shift.

## 2022-04-01 NOTE — DISCHARGE INSTRUCTIONS
Agency: [facility]  Phone: [phone]  Services to be provided: provide supplies and tube feeding formula

## 2022-04-01 NOTE — PROGRESS NOTES
Care Management Discharge Note    Discharge Date: 04/01/2022       Discharge Disposition: Home, [facility] will provide tube feeding supplies and formula    Discharge Services: [facility] for tube feeding supplies and formula    Discharge DME: no     Discharge Transportation: family     Private pay costs discussed: Not applicable    Education Provided on the Discharge Plan: per team    Handoff Referral Completed: Yes    Additional Information:  Plan home with [facility] to provide tube feeding supplies and formula, declined need for skilled home nurse visits. Kristen with [facility] confirms that supplies and formula to be delivered later today.        Betty Martinez RN

## 2022-04-01 NOTE — PROGRESS NOTES
CLINICAL NUTRITION SERVICES - REASSESSMENT NOTE     Nutrition Prescription    RECOMMENDATIONS FOR MDs/PROVIDERS TO ORDER:  Consider discontinuing IVF. Pt is tolerating TF at goal    Malnutrition Status:    Moderate in the context of chronic illness    Recommendations already ordered by Registered Dietitian (RD):  Ordered new weight    Future/Additional Recommendations:  Adjust tF pending tolerance, weight           CURRENT NUTRITION ORDERS  Diet: NPO  Nutrition Support: GT in place, 22 [place]  Syringe feeding  Goal: Juliatila Rodgers 1.4, 4 containers/day; 210 ml (7 oz) q 4 hours= 1300 ml, 1820 kcal, 80 g protein, 12 g fiber, 936 ml free H2) + 75 ml flush before and after each feeding = 1836 ml H20 total.       NEW FINDINGS  -Tolerating bolus TF at goal well  -Ongoing temp - blood culture sent    LABS  Labs reviewed    MEDICATIONS  Medications reviewed  Protonix, oral abx    Dosing Weight: 50.3 kg    ASSESSED NUTRITION NEEDS  *Difficult to determine calorie provisions/needs at home d/t blended food being provided  Estimated Energy Needs: 9736-2343 kcals/day (30 - 35 kcals/kg )  Justification: Underweight  Estimated Protein Needs: 50-60 grams protein/day (1 - 1.2 grams of pro/kg)  Justification: Increased needs, hypercatabolism with acute illness  Estimated Fluid Needs: 1260 + mL/day (25 ml +)   Justification: Maintenance    GI  Large BM last night    INTERVENTIONS  Implementation  Continue TF as ordered  New weight    Goals  Dave TF at goal- met  Maintain/gain weight- in progress  Regular bowels - progressing     Monitoring/Evaluation  Progress toward goals will be monitored and evaluated per protocol.    Yanna Martinez RDN, LD  #[phone]

## 2022-04-01 NOTE — PROGRESS NOTES
[facility]    Writer spoke with pt's grandmother, Pattie, to review benefits, home infusion and to offer choice of agency/home infusion provider.  Pt would like to go with \[facility]\"" for home enteral therapy needs.  Pattie is declining any home nursing visits at this time, but will call if this ever changes.  \[facility]\"" will provide the enteral formula and needed supplies.  Pattie declines any hospital teaching at this time, stating she has done home tube feeds for this patient for over 5 years.    \[facility]\"" will deliver formula and supplies today (late afternoon/early evening).    Thank you for the referral.    Kristen Katz RN, BSN  [facility] Liaison  [phone] (Mon through Fri, 8:00 am-5:00 pm)  [phone] (Office)

## 2022-04-01 NOTE — PROGRESS NOTES
Patient continue on bolus feeding tube. 210 ml of bolus given. Tolerated well. Patient had a large bowel movement this morning.

## 2022-04-01 NOTE — PROGRESS NOTES
Patient vitals signs taken from previous shift with T 101.3 F. PRN tylenol given via g-tube. MD made aware and gave new order for blood culture. Will continue to monitor.

## 2022-04-02 ENCOUNTER — HOME INFUSION (PRE-WILLOW HOME INFUSION) (OUTPATIENT)
Dept: PHARMACY | Facility: CLINIC | Age: 32
End: 2022-04-02

## 2022-04-03 LAB
BACTERIA BLD CULT: NO GROWTH
BACTERIA BLD CULT: NO GROWTH

## 2022-04-06 LAB — BACTERIA BLD CULT: NO GROWTH

## 2022-04-08 ENCOUNTER — HOME INFUSION (PRE-WILLOW HOME INFUSION) (OUTPATIENT)
Dept: PHARMACY | Facility: CLINIC | Age: 32
End: 2022-04-08

## 2022-07-17 ENCOUNTER — HOME INFUSION (PRE-WILLOW HOME INFUSION) (OUTPATIENT)
Dept: PHARMACY | Facility: CLINIC | Age: 32
End: 2022-07-17

## 2022-07-18 NOTE — PROGRESS NOTES
This is a recent snapshot of the patient's [facility] medical record.  For current drug dose and complete information and questions, call [phone] or In Basket pool, [facility] home infusion ([id number])  CSN Number:  [id number]

## 2022-08-26 NOTE — PROGRESS NOTES
This is a recent snapshot of the patient's [facility] medical record.  For current drug dose and complete information and questions, call [phone] or In Basket pool, [facility] home infusion ([id number])  CSN Number:  [id number]

## 2022-11-28 NOTE — PROGRESS NOTES
This is a recent snapshot of the patient's [facility] medical record.  For current drug dose and complete information and questions, call [phone] or In Basket pool, [facility] home infusion ([id number])  CSN Number:  [id number]

## 2022-12-01 NOTE — PROGRESS NOTES
This is a recent snapshot of the patient's [facility] medical record.  For current drug dose and complete information and questions, call [phone] or In Basket pool, [facility] home infusion ([id number])  CSN Number:  [id number]